# Patient Record
Sex: FEMALE | Race: WHITE | NOT HISPANIC OR LATINO | Employment: FULL TIME | URBAN - METROPOLITAN AREA
[De-identification: names, ages, dates, MRNs, and addresses within clinical notes are randomized per-mention and may not be internally consistent; named-entity substitution may affect disease eponyms.]

---

## 2017-02-09 PROBLEM — E28.2 PCOS (POLYCYSTIC OVARIAN SYNDROME): Status: ACTIVE | Noted: 2017-02-09

## 2017-02-09 PROBLEM — E66.01 MORBID OBESITY DUE TO EXCESS CALORIES: Status: ACTIVE | Noted: 2017-02-09

## 2017-10-24 ENCOUNTER — OFFICE VISIT (OUTPATIENT)
Dept: URGENT CARE | Facility: CLINIC | Age: 22
End: 2017-10-24
Payer: COMMERCIAL

## 2017-10-24 VITALS
HEIGHT: 65 IN | DIASTOLIC BLOOD PRESSURE: 97 MMHG | OXYGEN SATURATION: 99 % | TEMPERATURE: 98 F | BODY MASS INDEX: 36.65 KG/M2 | WEIGHT: 220 LBS | SYSTOLIC BLOOD PRESSURE: 145 MMHG | HEART RATE: 90 BPM

## 2017-10-24 DIAGNOSIS — J01.40 ACUTE PANSINUSITIS, RECURRENCE NOT SPECIFIED: Primary | ICD-10-CM

## 2017-10-24 PROCEDURE — 96372 THER/PROPH/DIAG INJ SC/IM: CPT | Mod: S$GLB,,, | Performed by: FAMILY MEDICINE

## 2017-10-24 PROCEDURE — 99203 OFFICE O/P NEW LOW 30 MIN: CPT | Mod: 25,S$GLB,, | Performed by: FAMILY MEDICINE

## 2017-10-24 RX ORDER — DEXAMETHASONE SODIUM PHOSPHATE 100 MG/10ML
7 INJECTION INTRAMUSCULAR; INTRAVENOUS ONCE
Status: COMPLETED | OUTPATIENT
Start: 2017-10-24 | End: 2017-10-24

## 2017-10-24 RX ORDER — BENZONATATE 200 MG/1
200 CAPSULE ORAL 3 TIMES DAILY PRN
Qty: 30 CAPSULE | Refills: 0 | Status: SHIPPED | OUTPATIENT
Start: 2017-10-24 | End: 2017-11-03

## 2017-10-24 RX ORDER — AZITHROMYCIN 250 MG/1
250 TABLET, FILM COATED ORAL DAILY
Qty: 6 TABLET | Refills: 0 | Status: SHIPPED | OUTPATIENT
Start: 2017-10-24 | End: 2017-12-15

## 2017-10-24 RX ORDER — CEFTRIAXONE 500 MG/1
500 INJECTION, POWDER, FOR SOLUTION INTRAMUSCULAR; INTRAVENOUS
Status: COMPLETED | OUTPATIENT
Start: 2017-10-24 | End: 2017-10-24

## 2017-10-24 RX ORDER — CODEINE PHOSPHATE AND GUAIFENESIN 10; 100 MG/5ML; MG/5ML
10 SOLUTION ORAL NIGHTLY PRN
Qty: 120 ML | Refills: 0 | Status: SHIPPED | OUTPATIENT
Start: 2017-10-24 | End: 2017-11-07

## 2017-10-24 RX ADMIN — CEFTRIAXONE 500 MG: 500 INJECTION, POWDER, FOR SOLUTION INTRAMUSCULAR; INTRAVENOUS at 11:10

## 2017-10-24 RX ADMIN — DEXAMETHASONE SODIUM PHOSPHATE 7 MG: 100 INJECTION INTRAMUSCULAR; INTRAVENOUS at 11:10

## 2017-10-24 NOTE — PATIENT INSTRUCTIONS
Consider adding over-the-counter PROBIOTICS to decrease some side-effects associated with antibiotics. When a person takes antibiotics, both the harmful bacteria and the beneficial bacteria are killed. A reduction of beneficial bacteria can lead to digestive problems, such as diarrhea, yeast infections and urinary tract infections.      Sinusitis (Antibiotic Treatment)    The sinuses are air-filled spaces within the bones of the face. They connect to the inside of the nose. Sinusitis is an inflammation of the tissue lining the sinus cavity. Sinus inflammation can occur during a cold. It can also be due to allergies to pollens and other particles in the air. Sinusitis can cause symptoms of sinus congestion and fullness. A sinus infection causes fever, headache and facial pain. There is often green or yellow drainage from the nose or into the back of the throat (post-nasal drip). You have been given antibiotics to treat this condition.  Home care:  · Take the full course of antibiotics as instructed. Do not stop taking them, even if you feel better.  · Drink plenty of water, hot tea, and other liquids. This may help thin mucus. It also may promote sinus drainage.  · Heat may help soothe painful areas of the face. Use a towel soaked in hot water. Or,  the shower and direct the hot spray onto your face. Using a vaporizer along with a menthol rub at night may also help.   · An expectorant containing guaifenesin may help thin the mucus and promote drainage from the sinuses.  · Over-the-counter decongestants may be used unless a similar medicine was prescribed. Nasal sprays work the fastest. Use one that contains phenylephrine or oxymetazoline. First blow the nose gently. Then use the spray. Do not use these medicines more often than directed on the label or symptoms may get worse. You may also use tablets containing pseudoephedrine. Avoid products that combine ingredients, because side effects may be  increased. Read labels. You can also ask the pharmacist for help. (NOTE: Persons with high blood pressure should not use decongestants. They can raise blood pressure.)  · Over-the-counter antihistamines may help if allergies contributed to your sinusitis.    · Do not use nasal rinses or irrigation during an acute sinus infection, unless told to by your health care provider. Rinsing may spread the infection to other sinuses.  · Use acetaminophen or ibuprofen to control pain, unless another pain medicine was prescribed. (If you have chronic liver or kidney disease or ever had a stomach ulcer, talk with your doctor before using these medicines. Aspirin should never be used in anyone under 18 years of age who is ill with a fever. It may cause severe liver damage.)  · Don't smoke. This can worsen symptoms.  Follow-up care  Follow up with your healthcare provider or our staff if you are not improving within the next week.  When to seek medical advice  Call your healthcare provider if any of these occur:  · Facial pain or headache becoming more severe  · Stiff neck  · Unusual drowsiness or confusion  · Swelling of the forehead or eyelids  · Vision problems, including blurred or double vision  · Fever of 100.4ºF (38ºC) or higher, or as directed by your healthcare provider  · Seizure  · Breathing problems  · Symptoms not resolving within 10 days  Date Last Reviewed: 4/13/2015  © 5491-8590 [employer]. [address] [place] [address], [place] [address]. All rights reserved. This information is not intended as a substitute for professional medical care. Always follow your healthcare professional's instructions.

## 2017-12-15 ENCOUNTER — OFFICE VISIT (OUTPATIENT)
Dept: URGENT CARE | Facility: CLINIC | Age: 22
End: 2017-12-15
Payer: COMMERCIAL

## 2017-12-15 VITALS
WEIGHT: 225 LBS | TEMPERATURE: 99 F | DIASTOLIC BLOOD PRESSURE: 73 MMHG | RESPIRATION RATE: 18 BRPM | OXYGEN SATURATION: 97 % | BODY MASS INDEX: 42.48 KG/M2 | SYSTOLIC BLOOD PRESSURE: 123 MMHG | HEIGHT: 61 IN | HEART RATE: 118 BPM

## 2017-12-15 DIAGNOSIS — R11.0 NAUSEA: ICD-10-CM

## 2017-12-15 DIAGNOSIS — R19.7 DIARRHEA, UNSPECIFIED TYPE: Primary | ICD-10-CM

## 2017-12-15 DIAGNOSIS — R00.0 TACHYCARDIA: ICD-10-CM

## 2017-12-15 DIAGNOSIS — R11.10 VOMITING, INTRACTABILITY OF VOMITING NOT SPECIFIED, PRESENCE OF NAUSEA NOT SPECIFIED, UNSPECIFIED VOMITING TYPE: ICD-10-CM

## 2017-12-15 PROCEDURE — S0119 ONDANSETRON 4 MG: HCPCS | Mod: S$GLB,,, | Performed by: EMERGENCY MEDICINE

## 2017-12-15 PROCEDURE — 99214 OFFICE O/P EST MOD 30 MIN: CPT | Mod: S$GLB,,, | Performed by: PHYSICIAN ASSISTANT

## 2017-12-15 RX ORDER — ONDANSETRON 4 MG/1
4 TABLET, ORALLY DISINTEGRATING ORAL
Status: COMPLETED | OUTPATIENT
Start: 2017-12-15 | End: 2017-12-15

## 2017-12-15 RX ORDER — LOPERAMIDE HYDROCHLORIDE 2 MG/1
2 CAPSULE ORAL 4 TIMES DAILY PRN
Qty: 26 CAPSULE | Refills: 0 | Status: SHIPPED | OUTPATIENT
Start: 2017-12-15 | End: 2017-12-19

## 2017-12-15 RX ORDER — ONDANSETRON 4 MG/1
4 TABLET, ORALLY DISINTEGRATING ORAL EVERY 6 HOURS PRN
Qty: 16 TABLET | Refills: 0 | Status: SHIPPED | OUTPATIENT
Start: 2017-12-15 | End: 2017-12-19

## 2017-12-15 RX ADMIN — ONDANSETRON 4 MG: 4 TABLET, ORALLY DISINTEGRATING ORAL at 09:12

## 2017-12-15 NOTE — PROGRESS NOTES
"Subjective:       Patient ID: Milana Navarro is a 22 y.o. female.    Vitals:  height is 5' 1" (1.549 m) and weight is 102.1 kg (225 lb). Her oral temperature is 99.4 °F (37.4 °C). Her blood pressure is 123/73 and her pulse is 118 (abnormal). Her respiration is 18 and oxygen saturation is 97%.     Chief Complaint: Diarrhea and Emesis    Diarrhea    This is a new problem. The current episode started yesterday. The problem occurs more than 10 times per day. The stool consistency is described as watery. The patient states that diarrhea awakens her from sleep. Associated symptoms include vomiting. Pertinent negatives include no abdominal pain, chills or fever. Nothing aggravates the symptoms. Risk factors include suspect food intake. She has tried bismuth subsalicylate for the symptoms. The treatment provided no relief.   Emesis    Associated symptoms include diarrhea. Pertinent negatives include no abdominal pain, chest pain, chills or fever.     Review of Systems   Constitution: Negative for chills and fever.   Cardiovascular: Negative for chest pain.   Respiratory: Negative for shortness of breath.    Musculoskeletal: Negative for back pain.   Gastrointestinal: Positive for diarrhea, nausea and vomiting. Negative for abdominal pain, constipation, hematochezia and melena.   Genitourinary: Negative for dysuria.       Objective:      Physical Exam   Constitutional: She is oriented to person, place, and time. She appears well-developed and well-nourished. She is cooperative.  Non-toxic appearance. She does not appear ill. No distress.   HENT:   Head: Normocephalic and atraumatic.   Right Ear: Hearing and external ear normal.   Left Ear: Hearing and external ear normal.   Nose: Nose normal.   Mouth/Throat: Uvula is midline, oropharynx is clear and moist and mucous membranes are normal. No posterior oropharyngeal erythema.   Eyes: Conjunctivae and lids are normal. Right eye exhibits no discharge. Left eye exhibits no " discharge. No scleral icterus.   Sclera clear bilat   Neck: Trachea normal, normal range of motion, full passive range of motion without pain and phonation normal. Neck supple.   Cardiovascular: Regular rhythm, normal heart sounds, intact distal pulses and normal pulses.  Tachycardia present.    Pulmonary/Chest: Effort normal and breath sounds normal. No respiratory distress. She has no decreased breath sounds. She has no wheezes. She has no rhonchi. She has no rales.   Abdominal: Soft. Normal appearance. She exhibits no distension, no abdominal bruit, no pulsatile midline mass and no mass. Bowel sounds are increased. There is generalized tenderness. There is no rigidity, no rebound, no guarding, no CVA tenderness, no tenderness at McBurney's point and negative Vela's sign.   Generalized abdominal tenderness.   Musculoskeletal: Normal range of motion. She exhibits no edema or deformity.   Neurological: She is alert and oriented to person, place, and time. She has normal strength. She exhibits normal muscle tone. Coordination normal.   Skin: Skin is warm, dry and intact. She is not diaphoretic. No pallor.   Turgor appropriate   Psychiatric: She has a normal mood and affect. Her speech is normal and behavior is normal. Judgment and thought content normal. Cognition and memory are normal.   Nursing note and vitals reviewed.      Assessment:       1. Diarrhea, unspecified type    2. Vomiting, intractability of vomiting not specified, presence of nausea not specified, unspecified vomiting type    3. Nausea    4. Tachycardia        - Tachycardia likely due to mild dehydration, no other signs/symptoms concerning for severe dehydration.  - Generalized abd tenderness likely of rectus muscles due to retching  Plan:         Diarrhea, unspecified type  -     loperamide (IMODIUM) 2 mg capsule; Take 1 capsule (2 mg total) by mouth 4 (four) times daily as needed for Diarrhea.  Dispense: 26 capsule; Refill: 0    Vomiting,  intractability of vomiting not specified, presence of nausea not specified, unspecified vomiting type    Nausea  -     ondansetron disintegrating tablet 4 mg; Take 1 tablet (4 mg total) by mouth one time.  -     ondansetron (ZOFRAN-ODT) 4 MG TbDL; Take 1 tablet (4 mg total) by mouth every 6 (six) hours as needed.  Dispense: 16 tablet; Refill: 0    Tachycardia        Patient Instructions   Please return here or go to the Emergency Department for any concerns or worsening of condition.  If you were prescribed antibiotics, please take them to completion.  If you were prescribed a narcotic medication, do not drive or operate heavy equipment or machinery while taking these medications.  Please follow up with your primary care doctor or specialist as needed.    If you  smoke, please stop smoking.      Uncertain Causes of Diarrhea (Adult)    Diarrhea is when stools are loose and watery. This can be caused by:  · Viral infections  · Bacterial infections  · Food poisoning  · Parasites  · Irritable bowel syndrome (IBS)  · Inflammatory bowel diseases such as ulcerative colitis, Crohn's disease, and celiac disease  · Food intolerance, such as to lactose, the sugar found in milk and milk products  · Reaction to medicines like antibiotics, laxatives, cancer drugs, and antacids  Along with diarrhea, you may also have:  · Abdominal pain and cramping  · Nausea and vomiting  · Loss of bowel control  · Fever and chills  · Bloody stools  In some cases, antibiotics may help to treat diarrhea. You may have a stool sample test. This is done to see what is causing your diarrhea, and if antibiotics will help treat it. The results of a stool sample test may take up to 2 days. The healthcare provider may not give you antibiotics until he or she has the stool test results.  Diarrhea can cause dehydration. This is the loss of too much water and other fluids from the body. When this occurs, body fluid must be replaced. This can be done with oral  rehydration solutions. Oral rehydration solutions are available at drugstores and grocery stores without a prescription.  Home care  Follow all instructions given by your healthcare provider. Rest at home for the next 24 hours, or until you feel better. Avoid caffeine, tobacco, and alcohol. These can make diarrhea, cramping, and pain worse.  If taking medicines:  · Dont take over-the-counter diarrhea or nausea medicines unless your healthcare provider tells you to.  · You may use acetaminophen or NSAID medicines like ibuprofen or naproxen to reduce pain and fever. Dont use these if you have chronic liver or kidney disease, or ever had a stomach ulcer or gastrointestinal bleeding. Don't use NSAID medicines if you are already taking one for another condition (like arthritis) or are on daily aspirin therapy (such as for heart disease or after a stroke). Talk with your healthcare provider first.  · If antibiotics were prescribed, be sure you take them until they are finished. Dont stop taking them even when you feel better. Antibiotics must be taken as a full course.  To prevent the spread of illness:  · Remember that washing with soap and water and using alcohol-based  is the best way to prevent the spread of infection.  · Clean the toilet after each use.  · Wash your hands before eating.  · Wash your hands before and after preparing food. Keep in mind that people with diarrhea or vomiting should not prepare food for others.  · Wash your hands after using cutting boards, countertops, and knives that have been in contact with raw foods.  · Wash and then peel fruits and vegetables.  · Keep uncooked meats away from cooked and ready-to-eat foods.  · Use a food thermometer when cooking. Cook poultry to at least 165°F (74°C). Cook ground meat (beef, veal, pork, lamb) to at least 160°F (71°C). Cook fresh beef, veal, lamb, and pork to at least 145°F (63°C).  · Dont eat raw or undercooked eggs (poached or odalys  side up), poultry, meat, or unpasteurized milk and juices.  Food and drinks  The main goal while treating vomiting or diarrhea is to prevent dehydration. This is done by taking small amounts of liquids often.  · Keep in mind that liquids are more important than food right now.  · Drink only small amounts of liquids at a time.  · Dont force yourself to eat, especially if you are having cramping, vomiting, or diarrhea. Dont eat large amounts at a time, even if you are hungry.  · If you eat, avoid fatty, greasy, spicy, or fried foods.  · Dont eat dairy foods or drink milk if you have diarrhea. These can make diarrhea worse.  During the first 24 hours you can try:  · Oral rehydration solutions. Do not use sports drinks. They have too much sugar and not enough electrolytes.  · Soft drinks without caffeine  · Ginger ale  · Water (plain or flavored)  · Decaf tea or coffee  · Clear broth, consommé, or bouillon  · Gelatin, popsicles, or frozen fruit juice bars  The second 24 hours, if you are feeling better, you can add:  · Hot cereal, plain toast, bread, rolls, or crackers  · Plain noodles, rice, mashed potatoes, chicken noodle soup, or rice soup  · Unsweetened canned fruit (no pineapple)  · Bananas  As you recover:  · Limit fat intake to less than 15 grams per day. Dont eat margarine, butter, oils, mayonnaise, sauces, gravies, fried foods, peanut butter, meat, poultry, or fish.  · Limit fiber. Dont eat raw or cooked vegetables, fresh fruits except bananas, or bran cereals.  · Limit caffeine and chocolate.  · Limit dairy.  · Dont use spices or seasonings except salt.  · Go back to your normal diet over time, as you feel better and your symptoms improve.  · If the symptoms come back, go back to a simple diet or clear liquids.  Follow-up care  Follow up with your healthcare provider, or as advised. If a stool sample was taken or cultures were done, call the healthcare provider for the results as instructed.  Call  911  Call 911 if you have any of these symptoms:  · Trouble breathing  · Confusion  · Extreme drowsiness or trouble walking  · Loss of consciousness  · Rapid heart rate  · Chest pain  · Stiff neck  · Seizure  When to seek medical advice  Call your healthcare provider right away if any of these occur:  · Abdominal pain that gets worse  · Constant lower right abdominal pain  · Continued vomiting and inability to keep liquids down  · Diarrhea more than 5 times a day  · Blood in vomit or stool  · Dark urine or no urine for 8 hours, dry mouth and tongue, tiredness, weakness, or dizziness  · Drowsiness  · New rash  · You dont get better in 2 to 3 days  · Fever of 100.4°F (38°C) or higher that doesnt get lower with medicine  Date Last Reviewed: 1/3/2016  © 4319-9841 [employer]. [address] [place] [address], [place] [address]. All rights reserved. This information is not intended as a substitute for professional medical care. Always follow your healthcare professional's instructions.

## 2017-12-15 NOTE — PATIENT INSTRUCTIONS
Please return here or go to the Emergency Department for any concerns or worsening of condition.  If you were prescribed antibiotics, please take them to completion.  If you were prescribed a narcotic medication, do not drive or operate heavy equipment or machinery while taking these medications.  Please follow up with your primary care doctor or specialist as needed.    If you  smoke, please stop smoking.      Uncertain Causes of Diarrhea (Adult)    Diarrhea is when stools are loose and watery. This can be caused by:  · Viral infections  · Bacterial infections  · Food poisoning  · Parasites  · Irritable bowel syndrome (IBS)  · Inflammatory bowel diseases such as ulcerative colitis, Crohn's disease, and celiac disease  · Food intolerance, such as to lactose, the sugar found in milk and milk products  · Reaction to medicines like antibiotics, laxatives, cancer drugs, and antacids  Along with diarrhea, you may also have:  · Abdominal pain and cramping  · Nausea and vomiting  · Loss of bowel control  · Fever and chills  · Bloody stools  In some cases, antibiotics may help to treat diarrhea. You may have a stool sample test. This is done to see what is causing your diarrhea, and if antibiotics will help treat it. The results of a stool sample test may take up to 2 days. The healthcare provider may not give you antibiotics until he or she has the stool test results.  Diarrhea can cause dehydration. This is the loss of too much water and other fluids from the body. When this occurs, body fluid must be replaced. This can be done with oral rehydration solutions. Oral rehydration solutions are available at drugstores and grocery stores without a prescription.  Home care  Follow all instructions given by your healthcare provider. Rest at home for the next 24 hours, or until you feel better. Avoid caffeine, tobacco, and alcohol. These can make diarrhea, cramping, and pain worse.  If taking medicines:  · Dont take  over-the-counter diarrhea or nausea medicines unless your healthcare provider tells you to.  · You may use acetaminophen or NSAID medicines like ibuprofen or naproxen to reduce pain and fever. Dont use these if you have chronic liver or kidney disease, or ever had a stomach ulcer or gastrointestinal bleeding. Don't use NSAID medicines if you are already taking one for another condition (like arthritis) or are on daily aspirin therapy (such as for heart disease or after a stroke). Talk with your healthcare provider first.  · If antibiotics were prescribed, be sure you take them until they are finished. Dont stop taking them even when you feel better. Antibiotics must be taken as a full course.  To prevent the spread of illness:  · Remember that washing with soap and water and using alcohol-based  is the best way to prevent the spread of infection.  · Clean the toilet after each use.  · Wash your hands before eating.  · Wash your hands before and after preparing food. Keep in mind that people with diarrhea or vomiting should not prepare food for others.  · Wash your hands after using cutting boards, countertops, and knives that have been in contact with raw foods.  · Wash and then peel fruits and vegetables.  · Keep uncooked meats away from cooked and ready-to-eat foods.  · Use a food thermometer when cooking. Cook poultry to at least 165°F (74°C). Cook ground meat (beef, veal, pork, lamb) to at least 160°F (71°C). Cook fresh beef, veal, lamb, and pork to at least 145°F (63°C).  · Dont eat raw or undercooked eggs (poached or odalys side up), poultry, meat, or unpasteurized milk and juices.  Food and drinks  The main goal while treating vomiting or diarrhea is to prevent dehydration. This is done by taking small amounts of liquids often.  · Keep in mind that liquids are more important than food right now.  · Drink only small amounts of liquids at a time.  · Dont force yourself to eat, especially if you  are having cramping, vomiting, or diarrhea. Dont eat large amounts at a time, even if you are hungry.  · If you eat, avoid fatty, greasy, spicy, or fried foods.  · Dont eat dairy foods or drink milk if you have diarrhea. These can make diarrhea worse.  During the first 24 hours you can try:  · Oral rehydration solutions. Do not use sports drinks. They have too much sugar and not enough electrolytes.  · Soft drinks without caffeine  · Ginger ale  · Water (plain or flavored)  · Decaf tea or coffee  · Clear broth, consommé, or bouillon  · Gelatin, popsicles, or frozen fruit juice bars  The second 24 hours, if you are feeling better, you can add:  · Hot cereal, plain toast, bread, rolls, or crackers  · Plain noodles, rice, mashed potatoes, chicken noodle soup, or rice soup  · Unsweetened canned fruit (no pineapple)  · Bananas  As you recover:  · Limit fat intake to less than 15 grams per day. Dont eat margarine, butter, oils, mayonnaise, sauces, gravies, fried foods, peanut butter, meat, poultry, or fish.  · Limit fiber. Dont eat raw or cooked vegetables, fresh fruits except bananas, or bran cereals.  · Limit caffeine and chocolate.  · Limit dairy.  · Dont use spices or seasonings except salt.  · Go back to your normal diet over time, as you feel better and your symptoms improve.  · If the symptoms come back, go back to a simple diet or clear liquids.  Follow-up care  Follow up with your healthcare provider, or as advised. If a stool sample was taken or cultures were done, call the healthcare provider for the results as instructed.  Call 911  Call 911 if you have any of these symptoms:  · Trouble breathing  · Confusion  · Extreme drowsiness or trouble walking  · Loss of consciousness  · Rapid heart rate  · Chest pain  · Stiff neck  · Seizure  When to seek medical advice  Call your healthcare provider right away if any of these occur:  · Abdominal pain that gets worse  · Constant lower right abdominal  pain  · Continued vomiting and inability to keep liquids down  · Diarrhea more than 5 times a day  · Blood in vomit or stool  · Dark urine or no urine for 8 hours, dry mouth and tongue, tiredness, weakness, or dizziness  · Drowsiness  · New rash  · You dont get better in 2 to 3 days  · Fever of 100.4°F (38°C) or higher that doesnt get lower with medicine  Date Last Reviewed: 1/3/2016  © 7139-7981 [employer]. [address] [place] [address], [place] [address]. All rights reserved. This information is not intended as a substitute for professional medical care. Always follow your healthcare professional's instructions.

## 2018-05-29 ENCOUNTER — HOSPITAL ENCOUNTER (EMERGENCY)
Facility: HOSPITAL | Age: 23
Discharge: HOME | End: 2018-05-29
Attending: EMERGENCY MEDICINE
Payer: COMMERCIAL

## 2018-05-29 VITALS
HEIGHT: 61 IN | DIASTOLIC BLOOD PRESSURE: 82 MMHG | TEMPERATURE: 98 F | OXYGEN SATURATION: 98 % | HEART RATE: 76 BPM | RESPIRATION RATE: 18 BRPM | SYSTOLIC BLOOD PRESSURE: 147 MMHG | WEIGHT: 210 LBS | BODY MASS INDEX: 39.65 KG/M2

## 2018-05-29 DIAGNOSIS — M25.473 ANKLE SWELLING: ICD-10-CM
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
## 2018-05-29 DIAGNOSIS — S93.402A SPRAIN OF LEFT ANKLE, UNSPECIFIED LIGAMENT, INITIAL ENCOUNTER: Primary | ICD-10-CM

## 2018-05-29 LAB
B-HCG UR QL: NEGATIVE
CTP QC/QA: YES

## 2018-05-29 PROCEDURE — 81025 URINE PREGNANCY TEST: CPT | Performed by: EMERGENCY MEDICINE

## 2018-05-29 PROCEDURE — 99284 EMERGENCY DEPT VISIT MOD MDM: CPT | Mod: 25

## 2018-05-29 RX ORDER — DICLOFENAC SODIUM 10 MG/G
2 GEL TOPICAL 4 TIMES DAILY
Qty: 1 TUBE | Refills: 0 | OUTPATIENT
Start: 2018-05-29 | End: 2021-10-15

## 2018-05-29 RX ORDER — NAPROXEN 375 MG/1
375 TABLET ORAL 2 TIMES DAILY PRN
Qty: 20 TABLET | Refills: 0 | Status: SHIPPED | OUTPATIENT
Start: 2018-05-29 | End: 2018-06-03

## 2018-05-29 NOTE — ED PROVIDER NOTES
Encounter Date: 5/29/2018       History     Chief Complaint   Patient presents with    Ankle Pain     pt reports slipping in gravel on Sunday and c/o left ankle pain. pt reports taking OTC medicine for pain but nothing has helped     22-year-old female chief complaint left ankle swelling and bruising.  Patient states she tripped rolling her ankle on Sunday.  Injury resulted from slipping on gravel.  Patient has been taking over-the-counter pain medicines which did relieve the pain.  Patient states she currently has 0 pain. Patient denies numbness tingling weakness. Patient does not smoke cigarettes, does not use drugs, and does not drink alcohol.      The history is provided by the patient.     Review of patient's allergies indicates:   Allergen Reactions    Bactrim [sulfamethoxazole-trimethoprim]      Past Medical History:   Diagnosis Date    PCOS (polycystic ovarian syndrome)      Past Surgical History:   Procedure Laterality Date    ELBOW SURGERY       Family History   Problem Relation Age of Onset    Colon cancer Maternal Grandfather     Breast cancer Neg Hx     Ovarian cancer Neg Hx      Social History   Substance Use Topics    Smoking status: Never Smoker    Smokeless tobacco: Never Used    Alcohol use No     Review of Systems   Constitutional: Negative for fever.   HENT: Negative for sore throat.    Respiratory: Negative for shortness of breath.    Cardiovascular: Negative for chest pain.   Gastrointestinal: Negative for nausea.   Genitourinary: Negative for dysuria.   Musculoskeletal: Positive for joint swelling. Negative for back pain.   Skin: Negative for rash.   Neurological: Negative for weakness.   Hematological: Bruises/bleeds easily.   All other systems reviewed and are negative.      Physical Exam     Initial Vitals [05/29/18 0743]   BP Pulse Resp Temp SpO2   (!) 152/97 102 18 97.8 °F (36.6 °C) 99 %      MAP       115.33         Physical Exam    Nursing note and vitals  reviewed.  Constitutional: She appears well-developed and well-nourished.   HENT:   Head: Normocephalic and atraumatic.   Right Ear: External ear normal.   Left Ear: External ear normal.   Nose: Nose normal.   Eyes: Conjunctivae and EOM are normal. Pupils are equal, round, and reactive to light. Right eye exhibits no discharge. Left eye exhibits no discharge.   Neck: Normal range of motion. Neck supple.   Cardiovascular: Normal rate, regular rhythm, normal heart sounds and intact distal pulses. Exam reveals no gallop and no friction rub.    No murmur heard.  Pulmonary/Chest: Breath sounds normal. No respiratory distress. She has no wheezes. She has no rhonchi. She has no rales. She exhibits no tenderness.   Musculoskeletal: She exhibits edema.        Right shoulder: She exhibits swelling.        Left knee: Normal. She exhibits normal range of motion, no swelling and no effusion. No tenderness found.        Left ankle: She exhibits decreased range of motion and swelling. Tenderness. Lateral malleolus tenderness found. No medial malleolus tenderness found.        Left foot: Normal. There is normal range of motion, no tenderness, no bony tenderness and no swelling.        Feet:    Neurological: She is alert and oriented to person, place, and time. She has normal strength. No sensory deficit.   Skin: Skin is warm and dry. Capillary refill takes less than 2 seconds. No rash noted. No pallor.   Psychiatric: She has a normal mood and affect.         ED Course   Procedures  Labs Reviewed   POCT URINE PREGNANCY        Imaging Results          X-Ray Tibia Fibula 2 View Left (Final result)  Result time 05/29/18 08:15:40    Final result by Sarabjit Clayton MD (05/29/18 08:15:40)                 Impression:      Soft tissue swelling overlying the lateral malleolus.  No evidence for acute fracture or bone destruction.      Electronically signed by: Sarabjit Clayton MD  Date:    05/29/2018  Time:    08:15             Narrative:     EXAMINATION:  XR TIBIA FIBULA 2 VIEW LEFT    CLINICAL HISTORY:  Unspecified fall, initial encounter    TECHNIQUE:  AP and lateral views of the left tibia and fibula were performed.    COMPARISON:  None.    FINDINGS:  The left tibia and fibula appear intact.  There is no evidence for acute fracture or bone destruction.  There is soft tissue swelling overlying the lateral malleolus.                               X-Ray Ankle Complete Left (Final result)  Result time 05/29/18 08:14:54    Final result by Sarabjit Clayton MD (05/29/18 08:14:54)                 Impression:      Soft tissue swelling overlying the lateral malleolus.  No evidence for acute fracture, bone destruction, or dislocation.      Electronically signed by: Sarabjit Clayton MD  Date:    05/29/2018  Time:    08:14             Narrative:    EXAMINATION:  XR ANKLE COMPLETE 3 VIEW LEFT    CLINICAL HISTORY:  Effusion, unspecified ankle    TECHNIQUE:  AP, lateral and oblique views of the left ankle were performed.    COMPARISON:  None    FINDINGS:  The bones appear intact.  There is no evidence for acute fracture or bone destruction.  Joint spaces are well maintained.  There is no evidence for dislocation.  There is soft tissue swelling overlying the lateral malleolus.                                                   Medical decision making   Chief complaint:  Left ankle pain and swelling   Differential diagnosis:  Strain, sprain, contusion, and fracture  Treatment in the ED Physical Exam, patient declined Toradol or ibuprofen.  Discussed labs, and imaging results.  Ace wrap applied.  Discussed use of compression hose.  Fill and take prescriptions as directed.  Return to the ED if symptoms worsen or do not resolve.   Answered questions and discussed discharge plan.    Patient feels much better and is ready for discharge.  Follow up with PCP in 1 days.       Clinical Impression:   The primary encounter diagnosis was Sprain of left ankle, unspecified ligament,  initial encounter. Diagnoses of Ankle swelling and Fall were also pertinent to this visit.                           Lissette Graham DO  05/29/18 0808

## 2018-11-30 ENCOUNTER — OFFICE VISIT (OUTPATIENT)
Dept: URGENT CARE | Facility: CLINIC | Age: 23
End: 2018-11-30
Payer: COMMERCIAL

## 2018-11-30 VITALS
TEMPERATURE: 99 F | HEART RATE: 95 BPM | DIASTOLIC BLOOD PRESSURE: 89 MMHG | HEIGHT: 61 IN | OXYGEN SATURATION: 99 % | SYSTOLIC BLOOD PRESSURE: 141 MMHG | WEIGHT: 215 LBS | BODY MASS INDEX: 40.59 KG/M2 | RESPIRATION RATE: 16 BRPM

## 2018-11-30 DIAGNOSIS — H92.03 PAIN IN EAR, BILATERAL: Primary | ICD-10-CM

## 2018-11-30 PROCEDURE — 96372 THER/PROPH/DIAG INJ SC/IM: CPT | Mod: S$GLB,,, | Performed by: NURSE PRACTITIONER

## 2018-11-30 PROCEDURE — 99214 OFFICE O/P EST MOD 30 MIN: CPT | Mod: 25,S$GLB,, | Performed by: NURSE PRACTITIONER

## 2018-11-30 PROCEDURE — 3008F BODY MASS INDEX DOCD: CPT | Mod: CPTII,S$GLB,, | Performed by: NURSE PRACTITIONER

## 2018-11-30 RX ORDER — BETAMETHASONE SODIUM PHOSPHATE AND BETAMETHASONE ACETATE 3; 3 MG/ML; MG/ML
9 INJECTION, SUSPENSION INTRA-ARTICULAR; INTRALESIONAL; INTRAMUSCULAR; SOFT TISSUE ONCE
Status: COMPLETED | OUTPATIENT
Start: 2018-11-30 | End: 2018-11-30

## 2018-11-30 RX ORDER — FLUTICASONE PROPIONATE 50 MCG
1 SPRAY, SUSPENSION (ML) NASAL DAILY
Qty: 1 BOTTLE | Refills: 0 | Status: SHIPPED | OUTPATIENT
Start: 2018-11-30 | End: 2020-02-20

## 2018-11-30 RX ADMIN — BETAMETHASONE SODIUM PHOSPHATE AND BETAMETHASONE ACETATE 9 MG: 3; 3 INJECTION, SUSPENSION INTRA-ARTICULAR; INTRALESIONAL; INTRAMUSCULAR; SOFT TISSUE at 11:11

## 2018-11-30 NOTE — PROGRESS NOTES
"Subjective:       Patient ID: Milana Navarro is a 23 y.o. female.    Vitals:  height is 5' 1" (1.549 m) and weight is 97.5 kg (215 lb). Her temperature is 98.5 °F (36.9 °C). Her blood pressure is 141/89 (abnormal) and her pulse is 95. Her respiration is 16 and oxygen saturation is 99%.     Chief Complaint: Otalgia    Cc x 3d,    C/o intermittent, mild dizziness       Otalgia    There is pain in both ears. This is a new problem. The current episode started in the past 7 days. The problem occurs constantly. Pertinent negatives include no coughing, rash, sore throat or vomiting.       Constitution: Negative for chills, sweating, fatigue and fever.   HENT: Positive for ear pain. Negative for congestion, sinus pain, sinus pressure, sore throat and voice change.    Neck: Negative for painful lymph nodes.   Eyes: Negative for eye redness.   Respiratory: Negative for chest tightness, cough, sputum production, bloody sputum, COPD, shortness of breath, stridor, wheezing and asthma.    Gastrointestinal: Negative for nausea and vomiting.   Musculoskeletal: Negative for muscle ache.   Skin: Negative for rash.   Allergic/Immunologic: Negative for seasonal allergies and asthma.   Hematologic/Lymphatic: Negative for swollen lymph nodes.       Objective:      Physical Exam    Assessment:       No diagnosis found.    Plan:         There are no diagnoses linked to this encounter.     "

## 2018-11-30 NOTE — PATIENT INSTRUCTIONS
Flonase nasal spray  Sudafed, Zyrtec-D, or Claritin-D        Earache, No Infection (Adult)  Earaches can happen without an infection. This occurs when air and fluid build up behind the eardrum causing a feeling of fullness and discomfort and reduced hearing. This is called otitis media with effusion (OME) or serous otitis media. It means there is fluid in the middle ear. It is not the same as acute otitis media, which is typically from infection.  OME can happen when you have a cold if congestion blocks the passage that drains the middle ear. This passage is called the eustachian tube. OME may also occur with nasal allergies or after a bacterial middle ear infection.    The pain or discomfort may come and go. You may hear clicking or popping sounds when you chew or swallow. You may feel that your balance is off. Or you may hear ringing in the ear.  It often takes from several weeks up to 3 months for the fluid to clear on its own. Oral pain relievers and ear drops help if there is pain. Decongestants and antihistamines sometimes help. Antibiotics don't help since there is no infection. Your doctor may prescribe a nasal spray to help reduce swelling in the nose and eustachian tube. This can allow the ear to drain.  If your OME doesn't improve after 3 months, surgery may be used to drain the fluid and insert a small tube in the eardrum to allow continued drainage.  Because the middle ear fluid can become infected, it is important to watch for signs of an ear infection which may develop later. These signs include increased ear pain, fever, or drainage from the ear.  Home care  The following guidelines will help you care for yourself at home:  · You may use over-the-counter medicine as directed to control pain, unless another medicine was prescribed. If you have chronic liver or kidney disease or ever had a stomach ulcer or GI bleeding, talk with your doctor before using these medicines. Aspirin should never be used in  anyone under 18 years of age who is ill with a fever. It may cause severe liver damage.  · You may use over-the-counter decongestants such as phenylephrine or pseudoephedrine. But they are not always helpful. Don't use nasal spray decongestants more than 3 days. Longer use can make congestion worse. Prescription nasal sprays from your doctor don't typically have those restrictions.  · Antihistamines may help if you are also having allergy symptoms.  · You may use medicines such as guaifenesin to thin mucus and promote drainage.  Follow-up care  Follow up with your healthcare provider or as advised if you are not feeling better after 3 days.  When to seek medical advice  Call your healthcare provider right away if any of the following occur:  · Your ear pain gets worse or does not start to improve   · Fever of 100.4°F (38°C) or higher, or as directed by your healthcare provider  · Fluid or blood draining from the ear  · Headache or sinus pain  · Stiff neck  · Unusual drowsiness or confusion  Date Last Reviewed: 10/1/2016  © 5298-6744 [employer]. [address] [place] [address], [place] [address]. All rights reserved. This information is not intended as a substitute for professional medical care. Always follow your healthcare professional's instructions.

## 2019-05-22 ENCOUNTER — OFFICE VISIT (OUTPATIENT)
Dept: URGENT CARE | Facility: CLINIC | Age: 24
End: 2019-05-22
Payer: COMMERCIAL

## 2019-05-22 VITALS
HEIGHT: 61 IN | TEMPERATURE: 99 F | DIASTOLIC BLOOD PRESSURE: 77 MMHG | WEIGHT: 203 LBS | BODY MASS INDEX: 38.33 KG/M2 | RESPIRATION RATE: 18 BRPM | SYSTOLIC BLOOD PRESSURE: 129 MMHG | HEART RATE: 90 BPM | OXYGEN SATURATION: 98 %

## 2019-05-22 DIAGNOSIS — S66.911A HAND STRAIN, RIGHT, INITIAL ENCOUNTER: ICD-10-CM

## 2019-05-22 DIAGNOSIS — M79.641 HAND PAIN, RIGHT: Primary | ICD-10-CM

## 2019-05-22 PROCEDURE — 3008F PR BODY MASS INDEX (BMI) DOCUMENTED: ICD-10-PCS | Mod: CPTII,S$GLB,, | Performed by: PHYSICIAN ASSISTANT

## 2019-05-22 PROCEDURE — 99214 PR OFFICE/OUTPT VISIT, EST, LEVL IV, 30-39 MIN: ICD-10-PCS | Mod: S$GLB,,, | Performed by: PHYSICIAN ASSISTANT

## 2019-05-22 PROCEDURE — 3008F BODY MASS INDEX DOCD: CPT | Mod: CPTII,S$GLB,, | Performed by: PHYSICIAN ASSISTANT

## 2019-05-22 PROCEDURE — 99214 OFFICE O/P EST MOD 30 MIN: CPT | Mod: S$GLB,,, | Performed by: PHYSICIAN ASSISTANT

## 2019-05-22 PROCEDURE — 73130 XR HAND COMPLETE 3 VIEW RIGHT: ICD-10-PCS | Mod: RT,S$GLB,, | Performed by: RADIOLOGY

## 2019-05-22 PROCEDURE — 73130 X-RAY EXAM OF HAND: CPT | Mod: RT,S$GLB,, | Performed by: RADIOLOGY

## 2019-05-22 NOTE — LETTER
May 22, 2019      [facility] [address], [place] [address]  Phone: [phone]  Fax: [phone]       Patient: Milana Navarro   YOB: 1995  Date of Visit: 05/22/2019    To Whom It May Concern:    Blessing Navarro  was at [facility] on 05/22/2019. She may return to work on 5/22/19 with no restrictions. If you have any questions or concerns, or if I can be of further assistance, please do not hesitate to contact me.    Sincerely,    Bronwyn Denney PA-C

## 2019-05-22 NOTE — PROGRESS NOTES
"Subjective:       Patient ID: Milana Navarro is a 23 y.o. female.    Vitals:  height is 5' 1" (1.549 m) and weight is 92.1 kg (203 lb). Her temperature is 98.5 °F (36.9 °C). Her blood pressure is 129/77 and her pulse is 90. Her respiration is 18 and oxygen saturation is 98%.     Chief Complaint: Hand Pain    Ms. Navarro presents today with right hand pain, specifically at the 3rd MCP joint.  This started 4 days ago.  She does denies knowledge of trauma, but is a rough sleeper & reports bruising the next day on hand & arm.  She has wrapped it with an ace bandage.      Hand Pain    The incident occurred 3 to 5 days ago. The incident occurred at home. There was no injury mechanism. The pain is present in the right hand. The quality of the pain is described as burning (throbbing). The pain does not radiate. The pain is at a severity of 5/10. The pain is moderate. The pain has been intermittent since the incident. Pertinent negatives include no chest pain. Nothing aggravates the symptoms. She has tried NSAIDs for the symptoms. The treatment provided mild relief.       Constitution: Negative for chills, fatigue and fever.   HENT: Negative for congestion and sore throat.    Neck: Negative for painful lymph nodes.   Cardiovascular: Negative for chest pain and leg swelling.   Eyes: Negative for double vision and blurred vision.   Respiratory: Negative for cough and shortness of breath.    Gastrointestinal: Negative for nausea, vomiting and diarrhea.   Genitourinary: Negative for dysuria, frequency, urgency and history of kidney stones.   Musculoskeletal: Positive for pain. Negative for joint pain, joint swelling, muscle cramps and muscle ache.        Right hand   Skin: Negative for color change, pale, rash and bruising.   Allergic/Immunologic: Negative for seasonal allergies.   Neurological: Negative for dizziness, history of vertigo, light-headedness, passing out and headaches.   Hematologic/Lymphatic: Negative for swollen " lymph nodes.   Psychiatric/Behavioral: Negative for nervous/anxious, sleep disturbance and depression. The patient is not nervous/anxious.        Objective:      Physical Exam   Constitutional: She is oriented to person, place, and time. She appears well-developed and well-nourished. She is cooperative.  Non-toxic appearance. She does not appear ill. No distress.   HENT:   Head: Normocephalic and atraumatic.   Right Ear: Hearing, tympanic membrane, external ear and ear canal normal.   Left Ear: Hearing, tympanic membrane, external ear and ear canal normal.   Nose: Nose normal. No mucosal edema, rhinorrhea or nasal deformity. No epistaxis. Right sinus exhibits no maxillary sinus tenderness and no frontal sinus tenderness. Left sinus exhibits no maxillary sinus tenderness and no frontal sinus tenderness.   Mouth/Throat: Uvula is midline, oropharynx is clear and moist and mucous membranes are normal. No trismus in the jaw. Normal dentition. No uvula swelling. No posterior oropharyngeal erythema.   Eyes: Conjunctivae and lids are normal. Right eye exhibits no discharge. Left eye exhibits no discharge. No scleral icterus.   Sclera clear bilat   Neck: Trachea normal, normal range of motion, full passive range of motion without pain and phonation normal. Neck supple.   Cardiovascular: Normal rate, regular rhythm, normal heart sounds, intact distal pulses and normal pulses.   Pulmonary/Chest: Effort normal and breath sounds normal. No respiratory distress.   Abdominal: Soft. Normal appearance and bowel sounds are normal. She exhibits no distension, no pulsatile midline mass and no mass. There is no tenderness.   Musculoskeletal: Normal range of motion. She exhibits no edema or deformity.        Arms:  Neurological: She is alert and oriented to person, place, and time. She exhibits normal muscle tone. Coordination normal.   Skin: Skin is warm, dry and intact. She is not diaphoretic. No pallor.   Psychiatric: She has a normal  mood and affect. Her speech is normal and behavior is normal. Judgment and thought content normal. Cognition and memory are normal.   Nursing note and vitals reviewed.      XR R hand - No significant radiographic abnormalities of the right hand.  Assessment:       1. Hand pain, right    2. Hand strain, right, initial encounter        Plan:         Hand pain, right  -     X-Ray Hand 3 View Right; Future; Expected date: 05/22/2019    Hand strain, right, initial encounter    Patient has ibuprofen 800mg at home, instructed to use PRN.     Patient Instructions   PLEASE READ YOUR DISCHARGE INSTRUCTIONS ENTIRELY AS IT CONTAINS IMPORTANT INFORMATION.  - Rest.    - Drink plenty of fluids.    - Tylenol or Ibuprofen as directed as needed for fever/pain.    - Follow up with your PCP or specialty clinic as directed in the next 1-2 weeks if not improved or as needed.  You can call [phone] to schedule an appointment with the appropriate provider.    - If you were prescribed antibiotics, please take them to completion.  - If you were prescribed a narcotic medication, do not drive or operate heavy equipment or machinery while taking these medications.  - If you  smoke, please stop smoking.  -You must understand that you've received an Urgent Care treatment only and that you may be released before all your medical problems are known or treated. You, the patient, will    arrange for follow up care as instructed.  - Please return to Urgent Care or to the Emergency Department if your symptoms worsen.    Patient aware and verbalized understanding.    Hand Sprain  A sprain is a stretching or tearing of the ligaments that hold a joint together. There are no broken bones. Sprains take 3 to 6 weeks to heal. A sprained hand may be treated with a splint or elastic wrap for support.  Home care  · Keep your arm elevated to reduce pain and swelling. This is most important during the first 48 hours.  · Apply an ice pack over the injured  area for 15 to 20 minutes every 3 to 6 hours. You should do this for the first 24 to 48 hours. You can make an ice pack by filling a plastic bag that seals at the top with ice cubes and then wrapping it with a thin towel. Continue the use of ice packs for relief of pain and swelling as needed. As the ice melts, be careful to avoid getting any wrap or splint wet. After 48 hours, apply heat (warm shower or warm bath) for 15 to 20 minutes several times a day, or alternate ice and heat.  · You may use over-the-counter pain medicine to control pain, unless another pain medicine was prescribed. If you have chronic liver or kidney disease or ever had a stomach ulcer or GI bleeding, talk with your healthcare provider before using these medicines.  · If you were given a splint or elastic wrap, wear it until your pain improves.  Follow-up care  Follow up with your healthcare provider as advised. Sometimes fractures dont show up on the first X-ray. Bruises and sprains can sometimes hurt as much as a fracture. These injuries can take time to heal completely. If your symptoms dont improve or they get worse, talk with your healthcare provider. You may need a repeat X-ray.  When to seek medical advice  Call your healthcare provider right away if any of these occur:  · Pain or swelling increases  · Fingers or hand becomes cold, blue, numb, or tingly  Date Last Reviewed: 11/20/2015  © 9647-2042 [employer]. [address] [place] [address], [place] [address]. All rights reserved. This information is not intended as a substitute for professional medical care. Always follow your healthcare professional's instructions.

## 2019-05-22 NOTE — PATIENT INSTRUCTIONS
PLEASE READ YOUR DISCHARGE INSTRUCTIONS ENTIRELY AS IT CONTAINS IMPORTANT INFORMATION.  - Rest.    - Drink plenty of fluids.    - Tylenol or Ibuprofen as directed as needed for fever/pain.    - Follow up with your PCP or specialty clinic as directed in the next 1-2 weeks if not improved or as needed.  You can call [phone] to schedule an appointment with the appropriate provider.    - If you were prescribed antibiotics, please take them to completion.  - If you were prescribed a narcotic medication, do not drive or operate heavy equipment or machinery while taking these medications.  - If you  smoke, please stop smoking.  -You must understand that you've received an Urgent Care treatment only and that you may be released before all your medical problems are known or treated. You, the patient, will    arrange for follow up care as instructed.  - Please return to Urgent Care or to the Emergency Department if your symptoms worsen.    Patient aware and verbalized understanding.    Hand Sprain  A sprain is a stretching or tearing of the ligaments that hold a joint together. There are no broken bones. Sprains take 3 to 6 weeks to heal. A sprained hand may be treated with a splint or elastic wrap for support.  Home care  · Keep your arm elevated to reduce pain and swelling. This is most important during the first 48 hours.  · Apply an ice pack over the injured area for 15 to 20 minutes every 3 to 6 hours. You should do this for the first 24 to 48 hours. You can make an ice pack by filling a plastic bag that seals at the top with ice cubes and then wrapping it with a thin towel. Continue the use of ice packs for relief of pain and swelling as needed. As the ice melts, be careful to avoid getting any wrap or splint wet. After 48 hours, apply heat (warm shower or warm bath) for 15 to 20 minutes several times a day, or alternate ice and heat.  · You may use over-the-counter pain medicine to control pain, unless another  pain medicine was prescribed. If you have chronic liver or kidney disease or ever had a stomach ulcer or GI bleeding, talk with your healthcare provider before using these medicines.  · If you were given a splint or elastic wrap, wear it until your pain improves.  Follow-up care  Follow up with your healthcare provider as advised. Sometimes fractures dont show up on the first X-ray. Bruises and sprains can sometimes hurt as much as a fracture. These injuries can take time to heal completely. If your symptoms dont improve or they get worse, talk with your healthcare provider. You may need a repeat X-ray.  When to seek medical advice  Call your healthcare provider right away if any of these occur:  · Pain or swelling increases  · Fingers or hand becomes cold, blue, numb, or tingly  Date Last Reviewed: 11/20/2015  © 6685-5932 [employer]. [address] [place] [address]. All rights reserved. This information is not intended as a substitute for professional medical care. Always follow your healthcare professional's instructions.

## 2020-01-27 ENCOUNTER — HOSPITAL ENCOUNTER (EMERGENCY)
Facility: HOSPITAL | Age: 25
Discharge: HOME | End: 2020-01-27
Attending: EMERGENCY MEDICINE
Payer: COMMERCIAL

## 2020-01-27 VITALS
OXYGEN SATURATION: 100 % | WEIGHT: 203 LBS | BODY MASS INDEX: 35.97 KG/M2 | RESPIRATION RATE: 18 BRPM | HEART RATE: 90 BPM | DIASTOLIC BLOOD PRESSURE: 80 MMHG | HEIGHT: 63 IN | TEMPERATURE: 98 F | SYSTOLIC BLOOD PRESSURE: 145 MMHG

## 2020-01-27 DIAGNOSIS — J02.9 EXUDATIVE PHARYNGITIS: Primary | ICD-10-CM

## 2020-01-27 LAB
B-HCG UR QL: NEGATIVE
CTP QC/QA: YES
POC MOLECULAR INFLUENZA A AGN: NEGATIVE
POC MOLECULAR INFLUENZA B AGN: NEGATIVE
S PYO RRNA THROAT QL PROBE: NEGATIVE

## 2020-01-27 PROCEDURE — 96372 THER/PROPH/DIAG INJ SC/IM: CPT | Mod: ER

## 2020-01-27 PROCEDURE — 25000003 PHARM REV CODE 250: Mod: ER | Performed by: EMERGENCY MEDICINE

## 2020-01-27 PROCEDURE — 63600175 PHARM REV CODE 636 W HCPCS: Mod: ER | Performed by: EMERGENCY MEDICINE

## 2020-01-27 PROCEDURE — 87081 CULTURE SCREEN ONLY: CPT

## 2020-01-27 PROCEDURE — 81025 URINE PREGNANCY TEST: CPT | Mod: ER | Performed by: EMERGENCY MEDICINE

## 2020-01-27 PROCEDURE — 99284 EMERGENCY DEPT VISIT MOD MDM: CPT | Mod: 25,ER

## 2020-01-27 PROCEDURE — 87880 STREP A ASSAY W/OPTIC: CPT | Mod: ER

## 2020-01-27 RX ORDER — IBUPROFEN 600 MG/1
600 TABLET ORAL EVERY 6 HOURS PRN
Qty: 20 TABLET | Refills: 0 | OUTPATIENT
Start: 2020-01-27 | End: 2020-02-20

## 2020-01-27 RX ORDER — ACETAMINOPHEN 500 MG
1000 TABLET ORAL EVERY 6 HOURS PRN
Qty: 30 TABLET | Refills: 0 | OUTPATIENT
Start: 2020-01-27 | End: 2022-03-10

## 2020-01-27 RX ORDER — ACETAMINOPHEN 325 MG/1
650 TABLET ORAL
Status: COMPLETED | OUTPATIENT
Start: 2020-01-27 | End: 2020-01-27

## 2020-01-27 RX ORDER — LIDOCAINE HYDROCHLORIDE 20 MG/ML
SOLUTION OROPHARYNGEAL
Qty: 100 ML | Refills: 0 | OUTPATIENT
Start: 2020-01-27 | End: 2022-03-10

## 2020-01-27 RX ADMIN — ACETAMINOPHEN 650 MG: 325 TABLET, FILM COATED ORAL at 08:01

## 2020-01-27 RX ADMIN — PENICILLIN G BENZATHINE 1.2 MILLION UNITS: 1200000 INJECTION, SUSPENSION INTRAMUSCULAR at 08:01

## 2020-01-29 LAB — BACTERIA THROAT CULT: NORMAL

## 2020-02-20 ENCOUNTER — HOSPITAL ENCOUNTER (EMERGENCY)
Facility: HOSPITAL | Age: 25
Discharge: HOME | End: 2020-02-20
Attending: INTERNAL MEDICINE
Payer: COMMERCIAL

## 2020-02-20 VITALS
BODY MASS INDEX: 38.98 KG/M2 | OXYGEN SATURATION: 100 % | SYSTOLIC BLOOD PRESSURE: 159 MMHG | DIASTOLIC BLOOD PRESSURE: 82 MMHG | TEMPERATURE: 98 F | RESPIRATION RATE: 18 BRPM | HEART RATE: 93 BPM | HEIGHT: 63 IN | WEIGHT: 220 LBS

## 2020-02-20 DIAGNOSIS — J06.9 ACUTE URI: Primary | ICD-10-CM

## 2020-02-20 LAB
B-HCG UR QL: NEGATIVE
CTP QC/QA: YES

## 2020-02-20 PROCEDURE — 81025 URINE PREGNANCY TEST: CPT | Mod: ER | Performed by: INTERNAL MEDICINE

## 2020-02-20 PROCEDURE — 99284 EMERGENCY DEPT VISIT MOD MDM: CPT | Mod: 25,ER

## 2020-02-20 RX ORDER — FLUTICASONE PROPIONATE 50 MCG
2 SPRAY, SUSPENSION (ML) NASAL DAILY
Qty: 15 G | Refills: 0 | OUTPATIENT
Start: 2020-02-20 | End: 2022-03-10

## 2020-02-20 RX ORDER — AZELASTINE 1 MG/ML
2 SPRAY, METERED NASAL 2 TIMES DAILY
Qty: 30 ML | Refills: 0 | OUTPATIENT
Start: 2020-02-20 | End: 2022-03-10

## 2020-02-20 RX ORDER — IBUPROFEN 800 MG/1
800 TABLET ORAL EVERY 8 HOURS PRN
Qty: 30 TABLET | Refills: 0 | Status: SHIPPED | OUTPATIENT
Start: 2020-02-20 | End: 2020-05-18 | Stop reason: SDUPTHER

## 2020-02-20 RX ORDER — OSELTAMIVIR PHOSPHATE 75 MG/1
75 CAPSULE ORAL 2 TIMES DAILY
Qty: 10 CAPSULE | Refills: 0 | Status: SHIPPED | OUTPATIENT
Start: 2020-02-20 | End: 2020-02-25

## 2020-02-20 NOTE — ED NOTES
24 y.o. Female to ED with c.o. Sinus congestion and head pressure x 4 days. Patient also endorses post nasal drip and mild dry cough. Patient denies n/v/d, fever/chills, chest pain/ shortness of breath.

## 2020-02-20 NOTE — ED PROVIDER NOTES
Encounter Date: 2/20/2020    SCRIBE #1 NOTE: I, Alfonso Perea, am scribing for, and in the presence of,  Dr. Barakat. I have scribed the following portions of the note - Other sections scribed: HPI, ROS, PE.       History     Chief Complaint   Patient presents with    sinus congestion     pt c/o sinus congestion for 4 days not getting any better.      Milana Navarro is a 24 y.o. female with history of PCOS who presents to the ED complaining of sinus congestion and 101 fever for 4 days. Patient is allergic to bactrim and corticosteroids. Patient reports she was diagnosed with strep several weeks ago and was prescribed penicillin. Patient has been taking dayquil.    The history is provided by the patient. No  was used.     Review of patient's allergies indicates:   Allergen Reactions    Bactrim [sulfamethoxazole-trimethoprim]     Corticosteroids (glucocorticoids) Dermatitis     Past Medical History:   Diagnosis Date    PCOS (polycystic ovarian syndrome)      Past Surgical History:   Procedure Laterality Date    ELBOW SURGERY      WISDOM TOOTH EXTRACTION       Family History   Problem Relation Age of Onset    Colon cancer Maternal Grandfather     Breast cancer Neg Hx     Ovarian cancer Neg Hx      Social History     Tobacco Use    Smoking status: Never Smoker    Smokeless tobacco: Never Used   Substance Use Topics    Alcohol use: No    Drug use: No     Review of Systems   Constitutional: Negative for fever (intermittent).   HENT: Positive for congestion. Negative for sore throat. Dental problem: sinus.    Respiratory: Negative for shortness of breath.    Cardiovascular: Negative for chest pain.   Gastrointestinal: Negative for nausea and vomiting.   Genitourinary: Negative for dysuria.   Musculoskeletal: Negative for back pain and myalgias.   Skin: Negative for rash.   Neurological: Negative for weakness.   Hematological: Negative for adenopathy.   Psychiatric/Behavioral: Negative for  behavioral problems.   All other systems reviewed and are negative.      Physical Exam     Initial Vitals [02/20/20 1053]   BP Pulse Resp Temp SpO2   (!) 159/82 93 18 98.1 °F (36.7 °C) 100 %      MAP       --         Physical Exam    Nursing note and vitals reviewed.  Constitutional: She appears well-developed and well-nourished.   HENT:   Head: Normocephalic and atraumatic.   Nose: Mucosal edema present.   Mouth/Throat: Uvula is midline and mucous membranes are normal. Posterior oropharyngeal erythema present. No oropharyngeal exudate or posterior oropharyngeal edema.   Clear nasal discharge and enlarged nasal turbinates noted.   Eyes: Right conjunctiva is injected. Left conjunctiva is injected.   Neck: Normal range of motion. Neck supple.   Cardiovascular: Normal rate, regular rhythm, S1 normal, S2 normal and normal heart sounds. Exam reveals no gallop and no friction rub.    No murmur heard.  Pulmonary/Chest: Effort normal and breath sounds normal. No respiratory distress. She has no decreased breath sounds. She has no wheezes. She has no rhonchi. She has no rales.   Heart rate at 83.   Abdominal: Soft. There is no tenderness.   Musculoskeletal: Normal range of motion. She exhibits no edema.   Neurological: She is alert and oriented to person, place, and time. GCS score is 15. GCS eye subscore is 4. GCS verbal subscore is 5. GCS motor subscore is 6.   Skin: Skin is warm and dry.   Psychiatric: She has a normal mood and affect.         ED Course   Procedures  Labs Reviewed   POCT URINE PREGNANCY          Imaging Results    None          Medical Decision Making:   History:   Old Medical Records: I decided to obtain old medical records.  Initial Assessment:   Milana Navarro is a 24 y.o. female with history of PCOS who presents to the ED complaining of sinus congestion and 101 fever for 4 days. Patient is allergic to bactrim and corticosteroids. Patient reports she was diagnosed with strep several weeks ago and was  prescribed penicillin. Patient has been taking dayquil.  Clinical Tests:   Lab Tests: Ordered and Reviewed  ED Management:  Patient was given instructions for viral URI and received prescriptions for Astelin/fluticasone/Tamiflu/ibuprofen.  She was advised to follow up with her primary care physician within the next week for re-evaluation/return to the emergency department if condition worsens.            Scribe Attestation:   Scribe #1: I performed the above scribed service and the documentation accurately describes the services I performed. I attest to the accuracy of the note.    This document was produced by a scribe under my direction and in my presence. I agree with the content of the note and have made any necessary edits.     Dr. Barakat    02/20/2020 6:09 PM                        Clinical Impression:     1. Acute URI            Disposition:   Disposition: Discharged  Condition: Stable                     Santana Barakat MD  02/20/20 3789

## 2020-02-26 ENCOUNTER — HOSPITAL ENCOUNTER (EMERGENCY)
Facility: HOSPITAL | Age: 25
Discharge: HOME | End: 2020-02-26
Attending: EMERGENCY MEDICINE
Payer: COMMERCIAL

## 2020-02-26 VITALS
TEMPERATURE: 98 F | OXYGEN SATURATION: 99 % | SYSTOLIC BLOOD PRESSURE: 178 MMHG | DIASTOLIC BLOOD PRESSURE: 87 MMHG | HEART RATE: 99 BPM | HEIGHT: 63 IN | WEIGHT: 220 LBS | RESPIRATION RATE: 18 BRPM | BODY MASS INDEX: 38.98 KG/M2

## 2020-02-26 DIAGNOSIS — N39.0 ACUTE URINARY TRACT INFECTION: ICD-10-CM

## 2020-02-26 DIAGNOSIS — J06.9 VIRAL URI: Primary | ICD-10-CM

## 2020-02-26 LAB
B-HCG UR QL: NEGATIVE
BILIRUBIN, POC UA: NEGATIVE
BLOOD, POC UA: ABNORMAL
CLARITY, POC UA: CLEAR
COLOR, POC UA: YELLOW
CTP QC/QA: YES
GLUCOSE, POC UA: NEGATIVE
KETONES, POC UA: NEGATIVE
LEUKOCYTE EST, POC UA: ABNORMAL
NITRITE, POC UA: NEGATIVE
PH UR STRIP: 6 [PH]
PROTEIN, POC UA: NEGATIVE
SPECIFIC GRAVITY, POC UA: >=1.03
UROBILINOGEN, POC UA: 0.2 E.U./DL

## 2020-02-26 PROCEDURE — 81025 URINE PREGNANCY TEST: CPT | Mod: ER | Performed by: EMERGENCY MEDICINE

## 2020-02-26 PROCEDURE — 99284 EMERGENCY DEPT VISIT MOD MDM: CPT | Mod: 25,ER

## 2020-02-26 PROCEDURE — 87086 URINE CULTURE/COLONY COUNT: CPT

## 2020-02-26 PROCEDURE — 81003 URINALYSIS AUTO W/O SCOPE: CPT | Mod: ER

## 2020-02-26 RX ORDER — AZELASTINE 1 MG/ML
2 SPRAY, METERED NASAL 2 TIMES DAILY
Qty: 30 ML | Refills: 0 | Status: SHIPPED | OUTPATIENT
Start: 2020-02-26 | End: 2020-03-18

## 2020-02-26 RX ORDER — NITROFURANTOIN 25; 75 MG/1; MG/1
100 CAPSULE ORAL 2 TIMES DAILY
Qty: 10 CAPSULE | Refills: 0 | Status: SHIPPED | OUTPATIENT
Start: 2020-02-26 | End: 2020-03-02

## 2020-02-26 NOTE — ED PROVIDER NOTES
Encounter Date: 2/26/2020       History     Chief Complaint   Patient presents with    Nasal Congestion     still feels bad after finishing flu meds. neg flu 2/20, chills nasal congestion denies fever     24-year-old female chief complaint runny nose congestion body aches and cough.  Patient was diagnosed with the flu last Thursday states she still feels bad.  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  Patient has not take anything for symptoms today.  Patient reports urinary frequency and pain with urination.        Review of patient's allergies indicates:   Allergen Reactions    Bactrim [sulfamethoxazole-trimethoprim]     Corticosteroids (glucocorticoids) Dermatitis     Past Medical History:   Diagnosis Date    Murmur     PCOS (polycystic ovarian syndrome)      Past Surgical History:   Procedure Laterality Date    ELBOW SURGERY      WISDOM TOOTH EXTRACTION       Family History   Problem Relation Age of Onset    Colon cancer Maternal Grandfather     Breast cancer Neg Hx     Ovarian cancer Neg Hx      Social History     Tobacco Use    Smoking status: Never Smoker    Smokeless tobacco: Never Used   Substance Use Topics    Alcohol use: No    Drug use: No     Review of Systems   Constitutional: Positive for chills. Negative for fever.   HENT: Positive for congestion. Negative for sore throat.    Respiratory: Positive for cough. Negative for shortness of breath.    Cardiovascular: Negative for chest pain.   Gastrointestinal: Negative for nausea.   Genitourinary: Positive for dysuria and frequency. Negative for flank pain.   Musculoskeletal: Negative for back pain.   Skin: Negative for rash.   Neurological: Negative for weakness.   Hematological: Does not bruise/bleed easily.   All other systems reviewed and are negative.      Physical Exam     Initial Vitals [02/26/20 0707]   BP Pulse Resp Temp SpO2   (!) 182/97 106 18 98.4 °F (36.9 °C) 100 %      MAP       --         Physical  Exam    Nursing note and vitals reviewed.  Constitutional: She appears well-developed and well-nourished.   HENT:   Head: Normocephalic and atraumatic.   Right Ear: External ear normal.   Left Ear: External ear normal.   Nose: Mucosal edema and rhinorrhea present. Right sinus exhibits no maxillary sinus tenderness. Left sinus exhibits no maxillary sinus tenderness.   Mouth/Throat: Uvula is midline, oropharynx is clear and moist and mucous membranes are normal.   Eyes: Conjunctivae and EOM are normal. Pupils are equal, round, and reactive to light. Right eye exhibits no discharge. Left eye exhibits no discharge.   Neck: Normal range of motion. Neck supple.   Cardiovascular: Normal rate, regular rhythm, normal heart sounds and intact distal pulses. Exam reveals no gallop and no friction rub.    No murmur heard.  Pulmonary/Chest: Breath sounds normal. No respiratory distress. She has no wheezes. She has no rhonchi. She has no rales. She exhibits no tenderness.   Abdominal: Soft. Bowel sounds are normal. She exhibits no distension and no mass. There is no tenderness. There is no rebound and no guarding.   No CVA tenderness   Musculoskeletal: Normal range of motion. She exhibits no edema or tenderness.   Neurological: She is alert and oriented to person, place, and time. She has normal strength and normal reflexes. She displays normal reflexes. No cranial nerve deficit or sensory deficit.   Skin: Skin is warm and dry. No rash and no abscess noted. No erythema. No pallor.   Psychiatric: She has a normal mood and affect.         ED Course   Procedures  Labs Reviewed   POCT URINALYSIS W/O SCOPE - Abnormal; Notable for the following components:       Result Value    Spec Grav UA >=1.030 (*)     Blood, UA Trace-intact (*)     Leukocytes, UA 1+ (*)     All other components within normal limits   CULTURE, URINE   POCT URINE PREGNANCY   POCT URINALYSIS W/O SCOPE          Imaging Results          X-Ray Chest PA And Lateral (Final  result)  Result time 02/26/20 07:44:01    Final result by Jan Morin MD (02/26/20 07:44:01)                 Impression:      Mild diminished depth of inspiration and radiographic appearance of crowding without evidence for superimposed acute intrathoracic process.      Electronically signed by: Jan Morin  Date:    02/26/2020  Time:    07:44             Narrative:    EXAMINATION:  XR CHEST PA AND LATERAL    CLINICAL HISTORY:  cough;    TECHNIQUE:  PA and lateral views of the chest were performed.    COMPARISON:  None    FINDINGS:  Two views of the chest are submitted.  There is mild rotation.  The cardiomediastinal silhouette appears appropriate.  There is mild diminished depth of inspiration with accentuation of pulmonary bronchovascular markings, when accounting for this there is no evidence for confluent infiltrate or consolidation, significant pleural effusion or pneumothorax.                                                      Medical decision making   Chief complaint:  Runny nose congestion, and dysuria patient not feeling better since she had the flu  Differential diagnosis:  Pregnancy, urinary tract infection, URI, bronchitis, pneumonia  Treatment in the ED Physical Exam.  Discussed labs, and imaging results.    Fill and take prescriptions as directed.  Return to the ED if symptoms worsen or do not resolve.   Answered questions and discussed discharge plan.    Patient feels better and is ready for discharge.  Follow up with PCP/specialist in 1 day.             Clinical Impression:       ICD-10-CM ICD-9-CM   1. Viral URI J06.9 465.9   2. Acute urinary tract infection N39.0 599.0                                Lissette Graham DO  02/26/20 0758

## 2020-02-27 ENCOUNTER — TELEPHONE (OUTPATIENT)
Dept: EMERGENCY MEDICINE | Facility: HOSPITAL | Age: 25
End: 2020-02-27

## 2020-02-28 LAB — BACTERIA UR CULT: NORMAL

## 2020-05-18 ENCOUNTER — HOSPITAL ENCOUNTER (EMERGENCY)
Facility: HOSPITAL | Age: 25
Discharge: HOME | End: 2020-05-18
Attending: EMERGENCY MEDICINE
Payer: COMMERCIAL

## 2020-05-18 VITALS
OXYGEN SATURATION: 98 % | SYSTOLIC BLOOD PRESSURE: 147 MMHG | HEIGHT: 63 IN | TEMPERATURE: 99 F | BODY MASS INDEX: 37.21 KG/M2 | WEIGHT: 210 LBS | DIASTOLIC BLOOD PRESSURE: 102 MMHG | HEART RATE: 102 BPM | RESPIRATION RATE: 18 BRPM

## 2020-05-18 DIAGNOSIS — S86.912A KNEE STRAIN, LEFT, INITIAL ENCOUNTER: Primary | ICD-10-CM

## 2020-05-18 DIAGNOSIS — M25.569 KNEE PAIN: ICD-10-CM

## 2020-05-18 LAB
B-HCG UR QL: NEGATIVE
CTP QC/QA: YES

## 2020-05-18 PROCEDURE — 81025 URINE PREGNANCY TEST: CPT | Mod: ER | Performed by: EMERGENCY MEDICINE

## 2020-05-18 PROCEDURE — 99284 EMERGENCY DEPT VISIT MOD MDM: CPT | Mod: 25,ER

## 2020-05-18 PROCEDURE — 29505 APPLICATION LONG LEG SPLINT: CPT

## 2020-05-18 RX ORDER — TRAMADOL HYDROCHLORIDE 50 MG/1
50 TABLET ORAL EVERY 4 HOURS PRN
Qty: 15 TABLET | Refills: 0 | Status: SHIPPED | OUTPATIENT
Start: 2020-05-18 | End: 2020-05-21

## 2020-05-18 RX ORDER — IBUPROFEN 800 MG/1
800 TABLET ORAL EVERY 8 HOURS PRN
Qty: 30 TABLET | Refills: 0 | OUTPATIENT
Start: 2020-05-18 | End: 2022-03-10

## 2020-05-18 NOTE — ED TRIAGE NOTES
Pt states she [place] and twisted left knee 3wks ago.  States she has had pain since.  States she has some swelling.  Pt has been wearing pull on brace that seems to help some.

## 2020-05-18 NOTE — ED PROVIDER NOTES
Encounter Date: 5/18/2020    SCRIBE #1 NOTE: I, Ameena Chaney , am scribing for, and in the presence of,  Dr. Holly . I have scribed the following portions of the note - Other sections scribed: HPI, ROS, PE .       History     Chief Complaint   Patient presents with    Knee Pain     TRIP AND FELL X 3 WEEKS AGO; NOW WITH PAIN AND SWELLING TO LEFT KNEE; ALSO REPORTS BURNING ORIGINATING AT INJURED KNEE RADIATING TO FOOT;      24 y.o. female with no medical problems says she stepped in a hole and twisted her left knee 3 weeks ago. 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 She still feels the knee is swollen and difficult to bend so she wanted to get it checked out. She has been taking Tylenol at home with mixed results. She has no other injuries.     The history is provided by the patient.     Review of patient's allergies indicates:   Allergen Reactions    Bactrim [sulfamethoxazole-trimethoprim]     Corticosteroids (glucocorticoids) Dermatitis     Past Medical History:   Diagnosis Date    Murmur     PCOS (polycystic ovarian syndrome)      Past Surgical History:   Procedure Laterality Date    ELBOW SURGERY      WISDOM TOOTH EXTRACTION       Family History   Problem Relation Age of Onset    Colon cancer Maternal Grandfather     Breast cancer Neg Hx     Ovarian cancer Neg Hx      Social History     Tobacco Use    Smoking status: Never Smoker    Smokeless tobacco: Never Used   Substance Use Topics    Alcohol use: No    Drug use: No     Review of Systems   Constitutional: Negative for fever.   HENT: Negative for sore throat.    Eyes: Negative for visual disturbance.   Respiratory: Negative for shortness of breath.    Cardiovascular: Negative for chest pain.   Gastrointestinal: Negative for abdominal pain.   Genitourinary: Negative for dysuria.   Musculoskeletal: Positive for arthralgias (left knee) and joint swelling (left knee). Negative  for back pain.   Skin: Negative for rash.   Neurological: Negative for headaches.       Physical Exam     Initial Vitals [05/18/20 0929]   BP Pulse Resp Temp SpO2   (!) 155/87 107 18 98.5 °F (36.9 °C) 97 %      MAP       --         Physical Exam    Nursing note and vitals reviewed.  Constitutional: She appears well-developed and well-nourished.   HENT:   Head: Normocephalic and atraumatic.   Right Ear: External ear normal.   Left Ear: External ear normal.   Nose: Nose normal.   Eyes: Conjunctivae and EOM are normal.   Neck: Normal range of motion. Neck supple. No thyromegaly present. No JVD present.   Cardiovascular: Normal rate and regular rhythm. Exam reveals no gallop and no friction rub.    No murmur heard.  Pulmonary/Chest: Breath sounds normal. No respiratory distress.   Abdominal: Soft. Bowel sounds are normal. There is no tenderness.   Musculoskeletal: She exhibits no edema or tenderness.        Left knee: She exhibits swelling (minimal) and effusion (minimal). She exhibits normal range of motion (ROM to 90 degrees with discomfort) and no bony tenderness.   No anterior or lateral instability. Pain with lateral meniscal stress by rotating the knee.    Neurological: She is alert and oriented to person, place, and time. She has normal strength. GCS score is 15. GCS eye subscore is 4. GCS verbal subscore is 5. GCS motor subscore is 6.   Skin: Skin is warm and dry. Capillary refill takes less than 2 seconds.   Psychiatric: She has a normal mood and affect.         ED Course   Procedures  Labs Reviewed   POCT URINE PREGNANCY          Imaging Results          X-Ray Knee 3 View Left (Final result)  Result time 05/18/20 10:37:17    Final result by Ronnie Arriola MD (05/18/20 10:37:17)                 Impression:      1. No acute displaced fracture, dislocation or suspicious osseous lesion.      Electronically signed by: Ronnie Arriola  Date:    05/18/2020  Time:    10:37             Narrative:    EXAMINATION:  XR KNEE  3 VIEW LEFT    CLINICAL HISTORY:  Knee pain, not otherwise specified.    TECHNIQUE:  3 views of the left knee    COMPARISON:  None    FINDINGS:  No acute displaced fracture, dislocation or suspicious osseous lesion. Anatomic alignment is maintained.    Regional soft tissues are grossly unremarkable.                              X-Rays:   Independently Interpreted Readings:   Other Readings:  Knee x-ray:  No fracture or dislocation    Medical Decision Making:   Initial Assessment:   X-ray is normal. Clinically suspect meniscal tear refer to orthopedics, treat with knee immobilizer, crutches for rest, antiinflammatories and breakthrough pain medication as needed. .   Clinical Tests:   Lab Tests: Ordered and Reviewed  Radiological Study: Ordered and Reviewed            Scribe Attestation:   Scribe #1: I performed the above scribed service and the documentation accurately describes the services I performed. I attest to the accuracy of the note.    I, Andrew Holly, personally performed the services described in this documentation. All medical record entries made by the scribe were at my direction and in my presence.  I have reviewed the chart and agree that the record reflects my personal performance and is accurate and complete                          Clinical Impression:     1. Knee strain, left, initial encounter    2. Knee pain                ED Disposition Condition    Discharge Stable        ED Prescriptions     Medication Sig Dispense Start Date End Date Auth. Provider    ibuprofen (ADVIL,MOTRIN) 800 MG tablet Take 1 tablet (800 mg total) by mouth every 8 (eight) hours as needed for Pain. 30 tablet 5/18/2020  Andrew Holly MD    traMADoL (ULTRAM) 50 mg tablet Take 1 tablet (50 mg total) by mouth every 4 (four) hours as needed (breeakthrough pain). 15 tablet 5/18/2020 5/21/2020 Andrew Holly MD        Follow-up Information     Follow up With Specialties Details Why Contact Info    Geoff JUDGE  MD [facility] Schedule an appointment as soon as possible for a visit   [address]  [address]  [place] [address]  [phone]                                       Andrew Holly MD  05/18/20 3725

## 2020-05-18 NOTE — ED NOTES
Appearance:  Pt awake, alert & oriented to person, place & time.  Pt in no acute distress at present time.  Skin:  Skin warm, dry & intact.  Mucous membranes moist.  Skin turgor normal.  Respiratory:  Respirations even, non-labored.    Neurologic:  Pt moving all extremities without difficulty.  Sensation intact.     Peripheral Vascular:  All peripheral pulses present.  Musculoskeletal:  Anterior & posterior tenderness.  Pain with ROM.

## 2021-10-15 ENCOUNTER — HOSPITAL ENCOUNTER (EMERGENCY)
Facility: HOSPITAL | Age: 26
Discharge: HOME | End: 2021-10-15
Attending: EMERGENCY MEDICINE
Payer: COMMERCIAL

## 2021-10-15 VITALS
HEART RATE: 75 BPM | TEMPERATURE: 98 F | RESPIRATION RATE: 18 BRPM | BODY MASS INDEX: 39.87 KG/M2 | WEIGHT: 225 LBS | OXYGEN SATURATION: 100 % | DIASTOLIC BLOOD PRESSURE: 74 MMHG | SYSTOLIC BLOOD PRESSURE: 128 MMHG | HEIGHT: 63 IN

## 2021-10-15 DIAGNOSIS — R10.9 LEFT SIDED ABDOMINAL PAIN: ICD-10-CM

## 2021-10-15 DIAGNOSIS — S00.83XA CONTUSION OF FOREHEAD, INITIAL ENCOUNTER: ICD-10-CM
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
## 2021-10-15 DIAGNOSIS — S43.402A SPRAIN OF LEFT SHOULDER, UNSPECIFIED SHOULDER SPRAIN TYPE, INITIAL ENCOUNTER: ICD-10-CM

## 2021-10-15 LAB
ALBUMIN SERPL-MCNC: 3.8 G/DL (ref 3.3–5.5)
ALP SERPL-CCNC: 75 U/L (ref 42–141)
B-HCG UR QL: NEGATIVE
BILIRUB SERPL-MCNC: 0.6 MG/DL (ref 0.2–1.6)
BUN SERPL-MCNC: 12 MG/DL (ref 7–22)
CALCIUM SERPL-MCNC: 9.4 MG/DL (ref 8–10.3)
CHLORIDE SERPL-SCNC: 104 MMOL/L (ref 98–108)
CREAT SERPL-MCNC: 0.7 MG/DL (ref 0.6–1.2)
CTP QC/QA: YES
GLUCOSE SERPL-MCNC: 121 MG/DL (ref 73–118)
POC ALT (SGPT): 32 U/L (ref 10–47)
POC AST (SGOT): 29 U/L (ref 11–38)
POC TCO2: 26 MMOL/L (ref 18–33)
POTASSIUM BLD-SCNC: 3.8 MMOL/L (ref 3.6–5.1)
PROTEIN, POC: 7.3 G/DL (ref 6.4–8.1)
SODIUM BLD-SCNC: 142 MMOL/L (ref 128–145)

## 2021-10-15 PROCEDURE — 96374 THER/PROPH/DIAG INJ IV PUSH: CPT | Mod: ER,59

## 2021-10-15 PROCEDURE — 81025 URINE PREGNANCY TEST: CPT | Mod: ER | Performed by: EMERGENCY MEDICINE

## 2021-10-15 PROCEDURE — 25500020 PHARM REV CODE 255: Mod: ER

## 2021-10-15 PROCEDURE — 80053 COMPREHEN METABOLIC PANEL: CPT | Mod: ER

## 2021-10-15 PROCEDURE — 85025 COMPLETE CBC W/AUTO DIFF WBC: CPT | Mod: ER

## 2021-10-15 PROCEDURE — 63600175 PHARM REV CODE 636 W HCPCS: Mod: ER | Performed by: EMERGENCY MEDICINE

## 2021-10-15 PROCEDURE — 99285 EMERGENCY DEPT VISIT HI MDM: CPT | Mod: 25,ER

## 2021-10-15 RX ORDER — NAPROXEN 500 MG/1
500 TABLET ORAL 2 TIMES DAILY WITH MEALS
Qty: 14 TABLET | Refills: 0 | Status: SHIPPED | OUTPATIENT
Start: 2021-10-15 | End: 2021-10-22

## 2021-10-15 RX ORDER — KETOROLAC TROMETHAMINE 30 MG/ML
15 INJECTION, SOLUTION INTRAMUSCULAR; INTRAVENOUS
Status: COMPLETED | OUTPATIENT
Start: 2021-10-15 | End: 2021-10-15

## 2021-10-15 RX ADMIN — IOHEXOL 100 ML: 350 INJECTION, SOLUTION INTRAVENOUS at 11:10

## 2021-10-15 RX ADMIN — KETOROLAC TROMETHAMINE 15 MG: 30 INJECTION, SOLUTION INTRAMUSCULAR at 10:10

## 2022-03-10 ENCOUNTER — HOSPITAL ENCOUNTER (EMERGENCY)
Facility: HOSPITAL | Age: 27
Discharge: HOME | End: 2022-03-10
Attending: EMERGENCY MEDICINE
Payer: COMMERCIAL

## 2022-03-10 VITALS
TEMPERATURE: 99 F | DIASTOLIC BLOOD PRESSURE: 82 MMHG | OXYGEN SATURATION: 99 % | RESPIRATION RATE: 16 BRPM | WEIGHT: 230 LBS | SYSTOLIC BLOOD PRESSURE: 130 MMHG | HEART RATE: 93 BPM | BODY MASS INDEX: 40.74 KG/M2

## 2022-03-10 DIAGNOSIS — Z02.89 ENCOUNTER TO OBTAIN EXCUSE FROM WORK: Primary | ICD-10-CM

## 2022-03-10 DIAGNOSIS — R19.7 NAUSEA, VOMITING, AND DIARRHEA: ICD-10-CM

## 2022-03-10 DIAGNOSIS — R11.2 NAUSEA, VOMITING, AND DIARRHEA: ICD-10-CM

## 2022-03-10 LAB
B-HCG UR QL: NEGATIVE
CTP QC/QA: YES

## 2022-03-10 PROCEDURE — 81025 URINE PREGNANCY TEST: CPT | Mod: ER | Performed by: EMERGENCY MEDICINE

## 2022-03-10 PROCEDURE — 99282 EMERGENCY DEPT VISIT SF MDM: CPT | Mod: ER

## 2022-03-10 RX ORDER — MEDROXYPROGESTERONE ACETATE 10 MG/1
TABLET ORAL
COMMUNITY
Start: 2021-12-17 | End: 2023-04-27

## 2022-03-10 RX ORDER — METFORMIN HYDROCHLORIDE 500 MG/1
TABLET ORAL
COMMUNITY
Start: 2021-12-17 | End: 2023-01-27 | Stop reason: SDUPTHER

## 2022-03-10 NOTE — FIRST PROVIDER EVALUATION
Emergency Department TeleTriage Encounter Note      CHIEF COMPLAINT    Chief Complaint   Patient presents with    Vomiting     RESOLVED N/V/D LAST NIGHT AFTER DINNER; STATES SHE NEEDS A NOTE TO GO BACK TO WORK; DENIES SYMPTOMS AT THIS TIME WITH MEDICATION       VITAL SIGNS   Initial Vitals [03/10/22 1146]   BP Pulse Resp Temp SpO2   135/84 95 18 98.8 °F (37.1 °C) 97 %      MAP       --            ALLERGIES    Review of patient's allergies indicates:   Allergen Reactions    Bactrim [sulfamethoxazole-trimethoprim]     Corticosteroids (glucocorticoids) Dermatitis       PROVIDER TRIAGE NOTE  This is a teletriage evaluation of a 26 y.o. female presenting to the ED needing a note to return to work after she and some friends became ill last night with N/V/D.  This has now resolved and the patient denies any sxs.    Initial orders will be placed and care will be transferred to an alternate provider when patient is roomed for a full evaluation. Any additional orders and the final disposition will be determined by that provider.           ORDERS  Labs Reviewed   POCT URINE PREGNANCY       ED Orders (720h ago, onward)    Start Ordered     Status Ordering Provider    03/10/22 1149 03/10/22 1149  POCT urine pregnancy  Once         Ordered CONNIE PABLO            Virtual Visit Note: The provider triage portion of this emergency department evaluation and documentation was performed via [employer], a HIPAA-compliant telemedicine application, in concert with a tele-presenter in the room. A face to face patient evaluation with one of my colleagues will occur once the patient is placed in an emergency department room.      DISCLAIMER: This note was prepared with [employer] voice recognition transcription software. Garbled syntax, mangled pronouns, and other bizarre constructions may be attributed to that software system.

## 2022-03-10 NOTE — Clinical Note
"Milana Rinaldi" Ramon was seen and treated in our emergency department on 3/10/2022.  She may return to work on 03/11/2022.       If you have any questions or concerns, please don't hesitate to call.      ANKITA Mcgrath"

## 2022-03-10 NOTE — ED PROVIDER NOTES
Encounter Date: 3/10/2022    SCRIBE #1 NOTE: I, Geoff Yair, am scribing for, and in the presence of, ANKITA Wesley.       History     Chief Complaint   Patient presents with    Vomiting     RESOLVED N/V/D LAST NIGHT AFTER DINNER; STATES SHE NEEDS A NOTE TO GO BACK TO WORK; DENIES SYMPTOMS AT THIS TIME WITH MEDICATION     26 year old female who presents to the ED with complaints of vomiting and diarrhea. Patient reports she went out to dinner last night with some friends and began experiencing vomiting and diarrhea shortly after dinner. Notes other friends also experienced similar symptoms. She took two Immodium's and one Benadryl last night with relief. Symptoms have since resolved but patient needs a doctor's note to return to work. Patient denies rash, fever, chest pain, SOB, numbness, weakness, tingling, abdominal pain, back pain, dysuria, hematuria, or any other complaints. Currently on Progestin for PCOS. No other complaints at this time.      The history is provided by the patient. No  was used.     Review of patient's allergies indicates:   Allergen Reactions    Bactrim [sulfamethoxazole-trimethoprim]     Corticosteroids (glucocorticoids) Dermatitis     Past Medical History:   Diagnosis Date    Murmur     PCOS (polycystic ovarian syndrome)      Past Surgical History:   Procedure Laterality Date    ELBOW SURGERY      KNEE SURGERY      WISDOM TOOTH EXTRACTION       Family History   Problem Relation Age of Onset    Colon cancer Maternal Grandfather     Breast cancer Neg Hx     Ovarian cancer Neg Hx      Social History     Tobacco Use    Smoking status: Never Smoker    Smokeless tobacco: Never Used   Substance Use Topics    Alcohol use: No    Drug use: No     Review of Systems   Constitutional: Negative for chills and fever.   HENT: Negative for congestion, ear pain, rhinorrhea, sore throat and trouble swallowing.    Eyes: Negative for pain, discharge and redness.    Respiratory: Negative for cough and shortness of breath.    Cardiovascular: Negative for chest pain.   Gastrointestinal: Positive for diarrhea, nausea and vomiting. Negative for abdominal pain.   Genitourinary: Negative for decreased urine volume and dysuria.   Musculoskeletal: Negative for back pain, neck pain and neck stiffness.   Skin: Negative for rash.   Neurological: Negative for dizziness, weakness, light-headedness, numbness and headaches.   Psychiatric/Behavioral: Negative for confusion.       Physical Exam     Initial Vitals [03/10/22 1146]   BP Pulse Resp Temp SpO2   135/84 95 18 98.8 °F (37.1 °C) 97 %      MAP       --         Physical Exam    Nursing note and vitals reviewed.  Constitutional: Vital signs are normal. She appears well-developed.  Non-toxic appearance. She does not appear ill.   HENT:   Head: Normocephalic and atraumatic.   Right Ear: External ear normal.   Left Ear: External ear normal.   Nose: Nose normal.   Mouth/Throat: Oropharynx is clear and moist.   Eyes: Conjunctivae are normal.   Neck:   Normal range of motion.  Cardiovascular: Normal rate and regular rhythm.   Pulmonary/Chest: Effort normal and breath sounds normal. She exhibits no tenderness.   Abdominal: Abdomen is soft. There is no abdominal tenderness.   Musculoskeletal:      Cervical back: Normal range of motion.     Neurological: She is alert and oriented to person, place, and time. Gait normal. GCS eye subscore is 4. GCS verbal subscore is 5. GCS motor subscore is 6.   Skin: Skin is warm, dry and intact. No rash noted.   Psychiatric: She has a normal mood and affect. Her speech is normal and behavior is normal. Judgment and thought content normal.         ED Course   Procedures  Labs Reviewed   POCT URINE PREGNANCY          Imaging Results    None          Medications - No data to display  Medical Decision Making:   History:   Old Medical Records: I decided to obtain old medical records.       APC / Resident Notes:    This is an evaluation of a 26 y.o. female that presents to the Emergency Department for resolved N/V/D, work excuse    Physical Exam shows a non-toxic, afebrile, and well appearing female. Normal PE with soft non-tender abdomen    Vital signs are reassuring. If available, previous records reviewed.   RESULTS: UPT negative    My overall impression is N/V/D, work excuse. I considered, but at this time, do not suspect intractable vomiting, intractable diarrhea, pregnancy, viral illness.    ED Course: PE. Discharge Meds/Instructions: None. The diagnosis, treatment plan, instructions for follow-up as well as ED return precautions were discussed. All questions have been answered.          Scribe Attestation:   Scribe #1: I performed the above scribed service and the documentation accurately describes the services I performed. I attest to the accuracy of the note.                 I, ELIJAH Chan, personally performed the services described in this documentation. All medical record entries made by the scribe were at my direction and in my presence. I have reviewed the chart and agree that the record reflects my personal performance and is accurate and complete.    Clinical Impression:   Final diagnoses:  [R11.2, R19.7] Nausea, vomiting, and diarrhea - resolved  [Z02.89] Encounter to obtain excuse from work (Primary)          ED Disposition Condition    Discharge Stable        ED Prescriptions     None        Follow-up Information     Follow up With Specialties Details Why Contact Info    Anton Rondon MD Family Medicine Schedule an appointment as soon as possible for a visit  As needed [address] [facility]  [place] [address]  [phone]             ANKITA Mcgrath  03/10/22 5088

## 2022-08-12 ENCOUNTER — HOSPITAL ENCOUNTER (EMERGENCY)
Facility: HOSPITAL | Age: 27
Discharge: HOME | End: 2022-08-12
Attending: EMERGENCY MEDICINE
Payer: COMMERCIAL

## 2022-08-12 VITALS
HEART RATE: 110 BPM | HEIGHT: 63 IN | TEMPERATURE: 99 F | RESPIRATION RATE: 18 BRPM | OXYGEN SATURATION: 99 % | BODY MASS INDEX: 40.75 KG/M2 | SYSTOLIC BLOOD PRESSURE: 140 MMHG | DIASTOLIC BLOOD PRESSURE: 81 MMHG | WEIGHT: 230 LBS

## 2022-08-12 DIAGNOSIS — T14.90XA INJURY: ICD-10-CM

## 2022-08-12 DIAGNOSIS — S86.912A KNEE STRAIN, LEFT, INITIAL ENCOUNTER: Primary | ICD-10-CM

## 2022-08-12 LAB
B-HCG UR QL: NEGATIVE
CTP QC/QA: YES

## 2022-08-12 PROCEDURE — 81025 URINE PREGNANCY TEST: CPT | Mod: ER | Performed by: EMERGENCY MEDICINE

## 2022-08-12 PROCEDURE — 99284 EMERGENCY DEPT VISIT MOD MDM: CPT | Mod: 25,ER

## 2022-08-12 RX ORDER — METHOCARBAMOL 500 MG/1
1000 TABLET, FILM COATED ORAL 3 TIMES DAILY
Qty: 30 TABLET | Refills: 0 | Status: SHIPPED | OUTPATIENT
Start: 2022-08-12 | End: 2022-08-17

## 2022-08-12 RX ORDER — IBUPROFEN 600 MG/1
600 TABLET ORAL EVERY 6 HOURS PRN
Qty: 20 TABLET | Refills: 0 | Status: SHIPPED | OUTPATIENT
Start: 2022-08-12 | End: 2022-08-17

## 2022-08-12 RX ORDER — DEXTROMETHORPHAN HYDROBROMIDE, GUAIFENESIN 5; 100 MG/5ML; MG/5ML
650 LIQUID ORAL EVERY 8 HOURS
Qty: 20 TABLET | Refills: 0 | Status: SHIPPED | OUTPATIENT
Start: 2022-08-12 | End: 2022-08-17

## 2022-08-12 NOTE — ED PROVIDER NOTES
"Encounter Date: 8/12/2022    SCRIBE #1 NOTE: I, Priscila Bhatt, am scribing for, and in the presence of,  ANKITA Chan. I have scribed the following portions of the note - Other sections scribed: HPI; ROS; PE.       History     Chief Complaint   Patient presents with    Knee Pain     Pt c/o left knee pain onset one hour ago when she "tweaked" it while getting into car.  Reports pain with weight bearing.     Milana Navarro is a 26 y.o. female with no pertinent medical Hx who presents to the ED for chief complaint of left knee pain onset today. Patient reports she was attempting to get into her vehicle when she "tweaked" her knee. She states she's had 2 previous surgeries to the left knee. Patient reports burning knee pain. She states she called her surgeon and was told to come to ED. She notes she is allergic to Bactrim. Patient denies rash, fever, chest pain, SOB, numbness, weakness, tingling, abdominal pain, back pain, dysuria, hematuria, nausea, vomiting, diarrhea, or any other complaints. Patient rates her pain as 5/10 and has not taken any medications for the symptoms. No alleviating/aggravating factors.  She does not use tobacco, EtOH, or recreational drugs.             The history is provided by the patient. No  was used.     Review of patient's allergies indicates:   Allergen Reactions    Bactrim [sulfamethoxazole-trimethoprim]     Corticosteroids (glucocorticoids) Dermatitis     Past Medical History:   Diagnosis Date    Murmur     PCOS (polycystic ovarian syndrome)      Past Surgical History:   Procedure Laterality Date    ELBOW SURGERY      KNEE SURGERY      WISDOM TOOTH EXTRACTION       Family History   Problem Relation Age of Onset    Colon cancer Maternal Grandfather     Breast cancer Neg Hx     Ovarian cancer Neg Hx      Social History     Tobacco Use    Smoking status: Never Smoker    Smokeless tobacco: Never Used   Substance Use Topics    Alcohol use: No    Drug " use: No     Review of Systems   Constitutional: Negative for chills and fever.   HENT: Negative for congestion, ear pain, rhinorrhea, sore throat and trouble swallowing.    Eyes: Negative for pain, discharge and redness.   Respiratory: Negative for cough and shortness of breath.    Cardiovascular: Negative for chest pain.   Gastrointestinal: Negative for abdominal pain, diarrhea, nausea and vomiting.   Genitourinary: Negative for decreased urine volume and dysuria.   Musculoskeletal: Positive for arthralgias (left knee). Negative for back pain, neck pain and neck stiffness.   Skin: Negative for rash.   Neurological: Negative for dizziness, weakness, light-headedness, numbness and headaches.   Psychiatric/Behavioral: Negative for confusion.       Physical Exam     Initial Vitals   BP Pulse Resp Temp SpO2   08/12/22 1132 08/12/22 1132 08/12/22 1132 08/12/22 1135 08/12/22 1132   134/73 110 20 98.7 °F (37.1 °C) 99 %      MAP       --                Physical Exam    Nursing note and vitals reviewed.  Constitutional: Vital signs are normal. She appears well-developed.  Non-toxic appearance. She does not appear ill.   HENT:   Head: Normocephalic and atraumatic.   Right Ear: External ear normal.   Left Ear: External ear normal.   Nose: Nose normal.   Mouth/Throat: Oropharynx is clear and moist.   Eyes: Conjunctivae are normal.   Neck:   Normal range of motion.  Cardiovascular: Normal rate and regular rhythm.   Pulmonary/Chest: Effort normal and breath sounds normal. She exhibits no tenderness.   Abdominal: Abdomen is soft. There is no abdominal tenderness.   Musculoskeletal:      Cervical back: Normal range of motion.      Left knee: No swelling, deformity, erythema or ecchymosis. Normal range of motion. Tenderness present.      Instability Tests: Anterior drawer test negative. Posterior drawer test negative.      Comments: Tenderness to the lateral aspect of the left patella. No swelling, bruising, rash, erythema, or  deformity. Negative anterior/posterior drawer. Normal ROM, strength, and sensation      Neurological: She is alert and oriented to person, place, and time. Gait normal. GCS eye subscore is 4. GCS verbal subscore is 5. GCS motor subscore is 6.   Skin: Skin is warm, dry and intact. No rash noted.   Psychiatric: She has a normal mood and affect. Her speech is normal and behavior is normal. Judgment and thought content normal.         ED Course   Orthopedic Injury    Date/Time: 8/12/2022 1:17 PM  Performed by: ANKITA Mcgrath  Authorized by: Heath Corona MD     Location procedure was performed:  [facility] EMERGENCY DEPARTMENT  Injury:     Injury location:  Knee    Location details:  Left knee    Injury type:  Soft tissue      Pre-procedure assessment:     Neurovascular status: Neurovascularly intact      Distal perfusion: normal      Neurological function: normal      Range of motion: normal        Selections made in this section will also lock the Injury type section above.:     Supplies used:  Elastic bandage    Complications: No      Estimated blood loss (mL):  0    Specimens: No      Implants: No    Post-procedure assessment:     Neurovascular status: Neurovascularly intact      Distal perfusion: normal      Neurological function: normal      Range of motion: normal      Patient tolerance:  Patient tolerated the procedure well with no immediate complications      Labs Reviewed   POCT URINE PREGNANCY          Imaging Results          X-Ray Knee 3 View Left (Final result)  Result time 08/12/22 12:20:37    Final result by Italo Morales MD (08/12/22 12:20:37)                 Impression:      No acute displaced fracture-dislocation identified.      Electronically signed by: Italo Morales MD  Date:    08/12/2022  Time:    12:20             Narrative:    EXAMINATION:  XR KNEE 3 VIEW LEFT    CLINICAL HISTORY:  Injury, unspecified, initial encounter    TECHNIQUE:  AP, lateral, and Merchant views of the left knee were  performed.    COMPARISON:  Left knee series 05/18/2020    FINDINGS:  Bones are well mineralized. Overall alignment is within normal limits. No displaced fracture, dislocation or destructive osseous process.  No large suprapatellar joint effusion.  Minimal DJD, progressed from 05/18/2020 study.  No subcutaneous emphysema or radiodense retained foreign body.                                 Medications - No data to display  Medical Decision Making:   History:   Old Medical Records: I decided to obtain old medical records.  Independently Interpreted Test(s):   I have ordered and independently interpreted X-rays - see prior notes.  Clinical Tests:   Lab Tests: Ordered and Reviewed  The following lab test(s) were unremarkable: UPT  Radiological Study: Ordered and Reviewed       APC / Resident Notes:   This is an evaluation of a 26 y.o. female that presents to the Emergency Department for left knee injury    Physical Exam shows a non-toxic, afebrile, and well appearing female. Tenderness to the lateral aspect of the left patella. No swelling, bruising, rash, erythema, or deformity. Negative anterior/posterior drawer. Normal ROM, strength, and sensation    Vital signs are reassuring. If available, previous records reviewed.   RESULTS: UPT negative; Xray negative     My overall impression is Left knee strain. I considered, but at this time, do not suspect fracture, dislocation, cellulitis, septic joint.    ED Course: Xray, Ice, Ace, UPT (meds offered, patient refused). Discharge Meds/Instructions: tylenol, Ibuprofen, Robaxin. The diagnosis, treatment plan, instructions for follow-up as well as ED return precautions were discussed. All questions have been answered.          Scribe Attestation:   Scribe #1: I performed the above scribed service and the documentation accurately describes the services I performed. I attest to the accuracy of the note.               Scribe attestation: I, ELIJAH Chan, personally performed  the services described in this documentation.  All medical record entries made by the scribe were at my direction and in my presence.  I have reviewed the chart and agree that the record reflects my personal performance and is accurate and complete.    Clinical Impression:   Final diagnoses:  [T14.90XA] Injury  [S86.912A] Knee strain, left, initial encounter (Primary)          ED Disposition Condition    Discharge Stable        ED Prescriptions     Medication Sig Dispense Start Date End Date Auth. Provider    acetaminophen (TYLENOL) 650 MG TbSR Take 1 tablet (650 mg total) by mouth every 8 (eight) hours. for 5 days 20 tablet 8/12/2022 8/17/2022 ANKITA Mcgrath    ibuprofen (ADVIL,MOTRIN) 600 MG tablet Take 1 tablet (600 mg total) by mouth every 6 (six) hours as needed for Pain. 20 tablet 8/12/2022 8/17/2022 ANKITA Mcgrath    methocarbamoL (ROBAXIN) 500 MG Tab Take 2 tablets (1,000 mg total) by mouth 3 (three) times daily. for 5 days 30 tablet 8/12/2022 8/17/2022 ANKITA Mcgrath        Follow-up Information     Follow up With Specialties Details Why Contact Info    Jean Pierre Sanchez MD Orthopedic Surgery Schedule an appointment as soon as possible for a visit in 2 days  [address] [facility] [address]  [facility] [address] [phone]      Anton Rondon MD Family Medicine Schedule an appointment as soon as possible for a visit in 2 days  [address] [facility] [id number] [phone]      [facility] ED Emergency Medicine Go to  If symptoms worsen [address] [facility] [address] [phone]           ANKITA Mcgrath  08/12/22 4623

## 2022-08-12 NOTE — Clinical Note
"Milana Rinaldi" Ramon was seen and treated in our emergency department on 8/12/2022.  She may return to work on 08/15/2022.       If you have any questions or concerns, please don't hesitate to call.      ANKITA Mcgrath"

## 2023-01-27 ENCOUNTER — OFFICE VISIT (OUTPATIENT)
Dept: FAMILY MEDICINE | Facility: CLINIC | Age: 28
End: 2023-01-27
Payer: COMMERCIAL

## 2023-01-27 VITALS
RESPIRATION RATE: 18 BRPM | BODY MASS INDEX: 48.02 KG/M2 | WEIGHT: 271 LBS | HEART RATE: 99 BPM | OXYGEN SATURATION: 99 % | HEIGHT: 63 IN | SYSTOLIC BLOOD PRESSURE: 138 MMHG | DIASTOLIC BLOOD PRESSURE: 74 MMHG

## 2023-01-27 DIAGNOSIS — Z76.89 ENCOUNTER TO ESTABLISH CARE: Primary | ICD-10-CM

## 2023-01-27 DIAGNOSIS — R73.9 HYPERGLYCEMIA: ICD-10-CM

## 2023-01-27 DIAGNOSIS — E28.2 PCOS (POLYCYSTIC OVARIAN SYNDROME): ICD-10-CM

## 2023-01-27 PROBLEM — J06.9 ACUTE URI: Status: RESOLVED | Noted: 2020-02-20 | Resolved: 2023-01-27

## 2023-01-27 PROBLEM — E66.813 CLASS 3 SEVERE OBESITY DUE TO EXCESS CALORIES WITHOUT SERIOUS COMORBIDITY WITH BODY MASS INDEX (BMI) OF 45.0 TO 49.9 IN ADULT: Status: ACTIVE | Noted: 2017-02-09

## 2023-01-27 PROCEDURE — 1159F MED LIST DOCD IN RCRD: CPT | Mod: CPTII,S$GLB,, | Performed by: FAMILY MEDICINE

## 2023-01-27 PROCEDURE — 3008F PR BODY MASS INDEX (BMI) DOCUMENTED: ICD-10-PCS | Mod: CPTII,S$GLB,, | Performed by: FAMILY MEDICINE

## 2023-01-27 PROCEDURE — 99204 OFFICE O/P NEW MOD 45 MIN: CPT | Mod: S$GLB,,, | Performed by: FAMILY MEDICINE

## 2023-01-27 PROCEDURE — 3008F BODY MASS INDEX DOCD: CPT | Mod: CPTII,S$GLB,, | Performed by: FAMILY MEDICINE

## 2023-01-27 PROCEDURE — 3078F DIAST BP <80 MM HG: CPT | Mod: CPTII,S$GLB,, | Performed by: FAMILY MEDICINE

## 2023-01-27 PROCEDURE — 99204 PR OFFICE/OUTPT VISIT, NEW, LEVL IV, 45-59 MIN: ICD-10-PCS | Mod: S$GLB,,, | Performed by: FAMILY MEDICINE

## 2023-01-27 PROCEDURE — 1159F PR MEDICATION LIST DOCUMENTED IN MEDICAL RECORD: ICD-10-PCS | Mod: CPTII,S$GLB,, | Performed by: FAMILY MEDICINE

## 2023-01-27 PROCEDURE — 3078F PR MOST RECENT DIASTOLIC BLOOD PRESSURE < 80 MM HG: ICD-10-PCS | Mod: CPTII,S$GLB,, | Performed by: FAMILY MEDICINE

## 2023-01-27 PROCEDURE — 3075F PR MOST RECENT SYSTOLIC BLOOD PRESS GE 130-139MM HG: ICD-10-PCS | Mod: CPTII,S$GLB,, | Performed by: FAMILY MEDICINE

## 2023-01-27 PROCEDURE — 3075F SYST BP GE 130 - 139MM HG: CPT | Mod: CPTII,S$GLB,, | Performed by: FAMILY MEDICINE

## 2023-01-27 RX ORDER — METFORMIN HYDROCHLORIDE 500 MG/1
TABLET ORAL
Qty: 90 TABLET | Refills: 1 | Status: SHIPPED | OUTPATIENT
Start: 2023-01-27 | End: 2023-07-24

## 2023-01-27 NOTE — PROGRESS NOTES
"  SUBJECTIVE:    Patient ID: Milana Navarro is a 27 y.o. female.    Chief Complaint: Establish Care    HPI  Milana Navarro is a 27 y.o. F who comes in to establish care with this PCP.     Acute issues:   -Glucose was high on labs done through previous physician. Since then, has lost weight ("2 pant sizes"), cutting back sweets, soft drinks. Has been trying to get pregnant for 2 years.  Brings in labs from 6 months ago:   A1c was 7.2%  Had normal CBC, CMP, TSH. Prolactin mildly elevated but repeat wnl.  Pap w NILM    Care Team:  -OB/Gyn - previously Dr Mobley; will soon be establishing with Dr Moreno    MedHx:  - PCOS  - Obesity  -*last BP recorded 140/81, today 138/74      SurgHx:  - knee surgery  - elbow surgery  - wisdom tooth extraction    SocHx:  Lives with boyfriend  Works as Realtor and Patient Information at [employer]  EtOH - seldom  Tobacco - never  Recreational drugs - never  Sexually active - yes (no BC)    FamHx:  DM- maternal uncle, mgm  HTN - Dad   HLD - none known  Early CVD - none known   CA - mgf (colon, dx'd in his 60s)    Health Maintenance:  *Cervical CA screen - utd with pap Dr Katheryn Mobley 10/25/22 NILM (will upload to portal)  *Immz due - flu (declines), TDaP UTD per pt    Review of patient's allergies indicates:   Allergen Reactions    Bactrim [sulfamethoxazole-trimethoprim]     Corticosteroids (glucocorticoids) Dermatitis         Current Outpatient Medications:     medroxyPROGESTERone (PROVERA) 10 MG tablet, Take by mouth., Disp: , Rfl:     metFORMIN (GLUCOPHAGE) 500 MG tablet, Take 1 tab PO with a meal., Disp: 90 tablet, Rfl: 1    Review of Systems   Constitutional:  Negative for activity change and unexpected weight change.   HENT:  Negative for hearing loss, rhinorrhea and trouble swallowing.    Eyes:  Negative for discharge and visual disturbance.   Respiratory:  Negative for chest tightness and wheezing.    Cardiovascular:  Negative for chest pain and palpitations.   Gastrointestinal:  Negative " "for blood in stool, constipation, diarrhea and vomiting.   Endocrine: Negative for polydipsia and polyuria.   Genitourinary:  Negative for difficulty urinating, dysuria, hematuria and menstrual problem.   Musculoskeletal:  Negative for arthralgias, joint swelling and neck pain.   Neurological:  Negative for weakness and headaches.   Psychiatric/Behavioral:  Negative for confusion and dysphoric mood.         Objective:      Vitals:    01/27/23 0854 01/27/23 0855   BP: 138/74    Pulse: 99    Resp: 18    SpO2: 99%    Weight:  122.9 kg (271 lb)   Height: 5' 3" (1.6 m) 5' 3" (1.6 m)     Physical Exam  Vitals reviewed.   Constitutional:       General: She is not in acute distress.     Appearance: She is obese. She is not ill-appearing.   Cardiovascular:      Rate and Rhythm: Normal rate and regular rhythm.      Pulses: Normal pulses.      Heart sounds: Normal heart sounds.   Pulmonary:      Effort: Pulmonary effort is normal.      Breath sounds: Normal breath sounds.   Abdominal:      General: There is no distension.      Palpations: Abdomen is soft.      Tenderness: There is no abdominal tenderness. There is no guarding.   Musculoskeletal:      Cervical back: Neck supple.   Lymphadenopathy:      Cervical: No cervical adenopathy.   Skin:     General: Skin is warm and dry.   Neurological:      General: No focal deficit present.      Mental Status: She is alert and oriented to person, place, and time.   Psychiatric:         Mood and Affect: Mood normal.         Behavior: Behavior normal.           Assessment:       1. Encounter to establish care    2. PCOS (polycystic ovarian syndrome)    3. Hyperglycemia         Plan:       Encounter to establish care  -     Hemoglobin A1C; Future; Expected date: 01/27/2023  -     Lipid Panel; Future; Expected date: 01/27/2023    PCOS (polycystic ovarian syndrome)  -     metFORMIN (GLUCOPHAGE) 500 MG tablet; Take 1 tab PO with a meal.  Dispense: 90 tablet; Refill: " 1    Hyperglycemia    -Obtained history and reviewed age-appropriate, history-driven recommendations for health maintenance including immunizations and cancer screenings. UTD w pap, which she will upload to portal. Declines flu shot. Labs as above.  -Will check labs including A1c. Given hyperglycemia in previous labs with a1c in diabetic range as well as PCOS, will start Metformin.  - Discussed increased risks of pregnancy at current BMI and with hyperglycemia; patient aware and wishes to proceed with attempt at conception. Will establish with OBGyn Dr Tiffanie langford.  - Monitor BP, in prehypertensive range.    No follow-ups on file.        1/28/2023 Jannette Otto M.D.

## 2023-01-28 PROBLEM — R73.9 HYPERGLYCEMIA: Status: ACTIVE | Noted: 2023-01-28

## 2023-01-30 ENCOUNTER — LAB VISIT (OUTPATIENT)
Dept: LAB | Facility: HOSPITAL | Age: 28
End: 2023-01-30
Attending: FAMILY MEDICINE
Payer: COMMERCIAL

## 2023-01-30 DIAGNOSIS — Z76.89 ENCOUNTER TO ESTABLISH CARE: ICD-10-CM

## 2023-01-30 LAB
CHOLEST SERPL-MCNC: 165 MG/DL (ref 120–199)
CHOLEST/HDLC SERPL: 3.4 {RATIO} (ref 2–5)
ESTIMATED AVG GLUCOSE: 134 MG/DL (ref 68–131)
HBA1C MFR BLD: 6.3 % (ref 4.5–6.2)
HDLC SERPL-MCNC: 49 MG/DL (ref 40–75)
HDLC SERPL: 29.7 % (ref 20–50)
LDLC SERPL CALC-MCNC: 97.4 MG/DL (ref 63–159)
NONHDLC SERPL-MCNC: 116 MG/DL
TRIGL SERPL-MCNC: 93 MG/DL (ref 30–150)

## 2023-01-30 PROCEDURE — 83036 HEMOGLOBIN GLYCOSYLATED A1C: CPT | Performed by: FAMILY MEDICINE

## 2023-01-30 PROCEDURE — 36415 COLL VENOUS BLD VENIPUNCTURE: CPT | Performed by: FAMILY MEDICINE

## 2023-01-30 PROCEDURE — 80061 LIPID PANEL: CPT | Performed by: FAMILY MEDICINE

## 2023-04-21 ENCOUNTER — OCCUPATIONAL HEALTH (OUTPATIENT)
Dept: URGENT CARE | Facility: CLINIC | Age: 28
End: 2023-04-21

## 2023-04-22 LAB
ALBUMIN SERPL-MCNC: 4.9 G/DL (ref 3.9–5)
ALBUMIN/GLOB SERPL: 1.8 {RATIO} (ref 1.2–2.2)
ALP SERPL-CCNC: 82 IU/L (ref 44–121)
ALT SERPL-CCNC: 22 IU/L (ref 0–32)
APPEARANCE UR: CLEAR
AST SERPL-CCNC: 15 IU/L (ref 0–40)
BACTERIA #/AREA URNS HPF: ABNORMAL /[HPF]
BASOPHILS # BLD AUTO: 0.1 X10E3/UL (ref 0–0.2)
BASOPHILS NFR BLD AUTO: 1 %
BILIRUB SERPL-MCNC: 0.5 MG/DL (ref 0–1.2)
BILIRUB UR QL STRIP: NEGATIVE
BUN SERPL-MCNC: 14 MG/DL (ref 6–20)
BUN/CREAT SERPL: 17 (ref 9–23)
CALCIUM SERPL-MCNC: 10.1 MG/DL (ref 8.7–10.2)
CASTS URNS QL MICRO: ABNORMAL /LPF
CHLORIDE SERPL-SCNC: 99 MMOL/L (ref 96–106)
CHOLEST SERPL-MCNC: 204 MG/DL (ref 100–199)
CO2 SERPL-SCNC: 22 MMOL/L (ref 20–29)
COLOR UR: YELLOW
CREAT SERPL-MCNC: 0.84 MG/DL (ref 0.57–1)
EOSINOPHIL # BLD AUTO: 0 X10E3/UL (ref 0–0.4)
EOSINOPHIL NFR BLD AUTO: 0 %
EPI CELLS #/AREA URNS HPF: >10 /HPF (ref 0–10)
ERYTHROCYTE [DISTWIDTH] IN BLOOD BY AUTOMATED COUNT: 13.1 % (ref 11.7–15.4)
EST. GFR  (NO RACE VARIABLE): 98 ML/MIN/1.73
GGT SERPL-CCNC: 22 IU/L (ref 0–60)
GLOBULIN SER CALC-MCNC: 2.7 G/DL (ref 1.5–4.5)
GLUCOSE SERPL-MCNC: 103 MG/DL (ref 70–99)
GLUCOSE UR QL STRIP: NEGATIVE
HCT VFR BLD AUTO: 43.5 % (ref 34–46.6)
HDLC SERPL-MCNC: 52 MG/DL
HGB BLD-MCNC: 14.5 G/DL (ref 11.1–15.9)
HGB UR QL STRIP: NEGATIVE
HIV 1+2 AB+HIV1 P24 AG SERPL QL IA: NON REACTIVE
IMM GRANULOCYTES # BLD AUTO: 0 X10E3/UL (ref 0–0.1)
IMM GRANULOCYTES NFR BLD AUTO: 0 %
KETONES UR QL STRIP: NEGATIVE
LDLC SERPL CALC-MCNC: 125 MG/DL (ref 0–99)
LEUKOCYTE ESTERASE UR QL STRIP: ABNORMAL
LYMPHOCYTES # BLD AUTO: 2.3 X10E3/UL (ref 0.7–3.1)
LYMPHOCYTES NFR BLD AUTO: 24 %
MCH RBC QN AUTO: 29.5 PG (ref 26.6–33)
MCHC RBC AUTO-ENTMCNC: 33.3 G/DL (ref 31.5–35.7)
MCV RBC AUTO: 88 FL (ref 79–97)
MICRO URNS: ABNORMAL
MONOCYTES # BLD AUTO: 0.5 X10E3/UL (ref 0.1–0.9)
MONOCYTES NFR BLD AUTO: 5 %
NEUTROPHILS # BLD AUTO: 6.9 X10E3/UL (ref 1.4–7)
NEUTROPHILS NFR BLD AUTO: 70 %
NITRITE UR QL STRIP: NEGATIVE
PH UR STRIP: 6 [PH] (ref 5–7.5)
PLATELET # BLD AUTO: 393 X10E3/UL (ref 150–450)
POTASSIUM SERPL-SCNC: 4.5 MMOL/L (ref 3.5–5.2)
PROT SERPL-MCNC: 7.6 G/DL (ref 6–8.5)
PROT UR QL STRIP: NEGATIVE
RBC # BLD AUTO: 4.92 X10E6/UL (ref 3.77–5.28)
RBC #/AREA URNS HPF: ABNORMAL /HPF (ref 0–2)
RPR SER QL: NON REACTIVE
SODIUM SERPL-SCNC: 139 MMOL/L (ref 134–144)
SP GR UR STRIP: 1.02 (ref 1–1.03)
TRIGL SERPL-MCNC: 155 MG/DL (ref 0–149)
UROBILINOGEN UR STRIP-MCNC: 0.2 MG/DL (ref 0.2–1)
VLDLC SERPL CALC-MCNC: 27 MG/DL (ref 5–40)
WBC # BLD AUTO: 9.8 X10E3/UL (ref 3.4–10.8)
WBC #/AREA URNS HPF: ABNORMAL /HPF (ref 0–5)

## 2023-04-24 ENCOUNTER — OCCUPATIONAL HEALTH (OUTPATIENT)
Dept: URGENT CARE | Facility: CLINIC | Age: 28
End: 2023-04-24

## 2023-04-24 DIAGNOSIS — Z13.9 ENCOUNTER FOR SCREENING: Primary | ICD-10-CM

## 2023-04-24 PROCEDURE — 86580 PR  TB INTRADERMAL TEST: ICD-10-PCS | Mod: S$GLB,,, | Performed by: EMERGENCY MEDICINE

## 2023-04-24 PROCEDURE — 86580 TB INTRADERMAL TEST: CPT | Mod: S$GLB,,, | Performed by: EMERGENCY MEDICINE

## 2023-04-27 ENCOUNTER — OFFICE VISIT (OUTPATIENT)
Dept: FAMILY MEDICINE | Facility: CLINIC | Age: 28
End: 2023-04-27
Payer: COMMERCIAL

## 2023-04-27 VITALS
BODY MASS INDEX: 46.78 KG/M2 | HEIGHT: 63 IN | SYSTOLIC BLOOD PRESSURE: 122 MMHG | DIASTOLIC BLOOD PRESSURE: 70 MMHG | WEIGHT: 264 LBS | OXYGEN SATURATION: 98 % | HEART RATE: 90 BPM | RESPIRATION RATE: 18 BRPM

## 2023-04-27 DIAGNOSIS — E66.01 CLASS 3 SEVERE OBESITY DUE TO EXCESS CALORIES WITHOUT SERIOUS COMORBIDITY WITH BODY MASS INDEX (BMI) OF 45.0 TO 49.9 IN ADULT: ICD-10-CM

## 2023-04-27 DIAGNOSIS — E11.9 TYPE 2 DIABETES MELLITUS WITHOUT COMPLICATION, WITHOUT LONG-TERM CURRENT USE OF INSULIN: Primary | ICD-10-CM

## 2023-04-27 DIAGNOSIS — R73.9 HYPERGLYCEMIA: ICD-10-CM

## 2023-04-27 DIAGNOSIS — E28.2 PCOS (POLYCYSTIC OVARIAN SYNDROME): ICD-10-CM

## 2023-04-27 LAB — HBA1C MFR BLD: 6.6 %

## 2023-04-27 PROCEDURE — 83036 HEMOGLOBIN GLYCOSYLATED A1C: CPT | Mod: QW,S$GLB,, | Performed by: FAMILY MEDICINE

## 2023-04-27 PROCEDURE — 3074F SYST BP LT 130 MM HG: CPT | Mod: CPTII,S$GLB,, | Performed by: FAMILY MEDICINE

## 2023-04-27 PROCEDURE — 3008F PR BODY MASS INDEX (BMI) DOCUMENTED: ICD-10-PCS | Mod: CPTII,S$GLB,, | Performed by: FAMILY MEDICINE

## 2023-04-27 PROCEDURE — 3078F PR MOST RECENT DIASTOLIC BLOOD PRESSURE < 80 MM HG: ICD-10-PCS | Mod: CPTII,S$GLB,, | Performed by: FAMILY MEDICINE

## 2023-04-27 PROCEDURE — 3074F PR MOST RECENT SYSTOLIC BLOOD PRESSURE < 130 MM HG: ICD-10-PCS | Mod: CPTII,S$GLB,, | Performed by: FAMILY MEDICINE

## 2023-04-27 PROCEDURE — 3008F BODY MASS INDEX DOCD: CPT | Mod: CPTII,S$GLB,, | Performed by: FAMILY MEDICINE

## 2023-04-27 PROCEDURE — 83036 POCT HEMOGLOBIN A1C: ICD-10-PCS | Mod: QW,S$GLB,, | Performed by: FAMILY MEDICINE

## 2023-04-27 PROCEDURE — 1159F MED LIST DOCD IN RCRD: CPT | Mod: CPTII,S$GLB,, | Performed by: FAMILY MEDICINE

## 2023-04-27 PROCEDURE — 3044F HG A1C LEVEL LT 7.0%: CPT | Mod: CPTII,S$GLB,, | Performed by: FAMILY MEDICINE

## 2023-04-27 PROCEDURE — 3044F PR MOST RECENT HEMOGLOBIN A1C LEVEL <7.0%: ICD-10-PCS | Mod: CPTII,S$GLB,, | Performed by: FAMILY MEDICINE

## 2023-04-27 PROCEDURE — 3078F DIAST BP <80 MM HG: CPT | Mod: CPTII,S$GLB,, | Performed by: FAMILY MEDICINE

## 2023-04-27 PROCEDURE — 1159F PR MEDICATION LIST DOCUMENTED IN MEDICAL RECORD: ICD-10-PCS | Mod: CPTII,S$GLB,, | Performed by: FAMILY MEDICINE

## 2023-04-27 PROCEDURE — 99213 PR OFFICE/OUTPT VISIT, EST, LEVL III, 20-29 MIN: ICD-10-PCS | Mod: S$GLB,,, | Performed by: FAMILY MEDICINE

## 2023-04-27 PROCEDURE — 99213 OFFICE O/P EST LOW 20 MIN: CPT | Mod: S$GLB,,, | Performed by: FAMILY MEDICINE

## 2023-04-27 RX ORDER — SEMAGLUTIDE 0.68 MG/ML
INJECTION, SOLUTION SUBCUTANEOUS
Qty: 9 ML | Refills: 3 | Status: SHIPPED | OUTPATIENT
Start: 2023-04-27 | End: 2024-03-01

## 2023-04-27 NOTE — PROGRESS NOTES
SUBJECTIVE:    Patient ID: Milana Navarro is a 27 y.o. female.    Chief Complaint: Follow-up, Hyperglycemia, and PCOS    HPI  Milana Navarro is a 27 y.o. female who established care with me 3 months ago. She has a hx of Hyperglycemia, PCOS, Decreased fertility, and Prehypertension.     At last OV, review of labs showed A1c in diabetic range at 7.2% six months prior, but pt reported significant weight loss and repeat was 6.3%. She was started on Metformin. Today at POC 6.6%. Since last OV, has changed diet, losing weight. Going to gym. Has gone from size 3XL to XL (objectively 271 lb > 264 lb since last OV)    She denies personal history of pancreatitis. No personal nor family hx MTC or MEN2.      Office Visit on 04/27/2023   Component Date Value Ref Range Status    Hemoglobin A1C, POC 04/27/2023 6.6  % Final   Orders Only on 04/21/2023   Component Date Value Ref Range Status    WBC 04/21/2023 9.8  3.4 - 10.8 x10E3/uL Final    RBC 04/21/2023 4.92  3.77 - 5.28 x10E6/uL Final    Hemoglobin 04/21/2023 14.5  11.1 - 15.9 g/dL Final    Hematocrit 04/21/2023 43.5  34.0 - 46.6 % Final    MCV 04/21/2023 88  79 - 97 fL Final    MCH 04/21/2023 29.5  26.6 - 33.0 pg Final    MCHC 04/21/2023 33.3  31.5 - 35.7 g/dL Final    RDW 04/21/2023 13.1  11.7 - 15.4 % Final    Platelets 04/21/2023 393  150 - 450 x10E3/uL Final    Neutrophils 04/21/2023 70  Not Estab. % Final    Lymphs 04/21/2023 24  Not Estab. % Final    Monocytes 04/21/2023 5  Not Estab. % Final    Eos 04/21/2023 0  Not Estab. % Final    Basos 04/21/2023 1  Not Estab. % Final    Neutrophils (Absolute) 04/21/2023 6.9  1.4 - 7.0 x10E3/uL Final    Lymphs (Absolute) 04/21/2023 2.3  0.7 - 3.1 x10E3/uL Final    Monocytes(Absolute) 04/21/2023 0.5  0.1 - 0.9 x10E3/uL Final    Eos (Absolute) 04/21/2023 0.0  0.0 - 0.4 x10E3/uL Final    Baso (Absolute) 04/21/2023 0.1  0.0 - 0.2 x10E3/uL Final    Immature Granulocytes 04/21/2023 0  Not Estab. % Final    Immature Grans (Abs)  04/21/2023 0.0  0.0 - 0.1 x10E3/uL Final    Glucose 04/21/2023 103 (H)  70 - 99 mg/dL Final    BUN 04/21/2023 14  6 - 20 mg/dL Final    Creatinine 04/21/2023 0.84  0.57 - 1.00 mg/dL Final    eGFR 04/21/2023 98  >59 mL/min/1.73 Final    BUN/Creatinine Ratio 04/21/2023 17  9 - 23 Final    Sodium 04/21/2023 139  134 - 144 mmol/L Final    Potassium 04/21/2023 4.5  3.5 - 5.2 mmol/L Final    Chloride 04/21/2023 99  96 - 106 mmol/L Final    CO2 04/21/2023 22  20 - 29 mmol/L Final    Calcium 04/21/2023 10.1  8.7 - 10.2 mg/dL Final    Protein, Total 04/21/2023 7.6  6.0 - 8.5 g/dL Final    Albumin 04/21/2023 4.9  3.9 - 5.0 g/dL Final    Globulin, Total 04/21/2023 2.7  1.5 - 4.5 g/dL Final    Albumin/Globulin Ratio 04/21/2023 1.8  1.2 - 2.2 Final    Total Bilirubin 04/21/2023 0.5  0.0 - 1.2 mg/dL Final    Alkaline Phosphatase 04/21/2023 82  44 - 121 IU/L Final    AST 04/21/2023 15  0 - 40 IU/L Final    ALT 04/21/2023 22  0 - 32 IU/L Final    Specific Gravity, UA 04/21/2023 1.016  1.005 - 1.030 Final    pH, UA 04/21/2023 6.0  5.0 - 7.5 Final    Color, UA 04/21/2023 Yellow  Yellow Final    Clarity, UA 04/21/2023 Clear  Clear Final    Leukocytes, UA 04/21/2023 2+ (A)  Negative Final    Protein, UA 04/21/2023 Negative  Negative/Trace Final    Glucose, UA 04/21/2023 Negative  Negative Final    Ketones, UA 04/21/2023 Negative  Negative Final    Occult Blood UA 04/21/2023 Negative  Negative Final    Bilirubin, UA 04/21/2023 Negative  Negative Final    Urobilinogen, UA 04/21/2023 0.2  0.2 - 1.0 mg/dL Final    Nitrite, UA 04/21/2023 Negative  Negative Final    Microscopic Examination 04/21/2023 See below:   Final    WBC, UA 04/21/2023 11-30 (A)  0 - 5 /hpf Final    RBC, UA 04/21/2023 0-2  0 - 2 /hpf Final    Epithelial Cells (non renal) 04/21/2023 >10 (A)  0 - 10 /hpf Final    Casts 04/21/2023 None seen  None seen /lpf Final    Bacteria, UA 04/21/2023 Few  None seen/Few Final    Cholesterol 04/21/2023 204 (H)  100 - 199 mg/dL Final     Triglycerides 04/21/2023 155 (H)  0 - 149 mg/dL Final    HDL 04/21/2023 52  >39 mg/dL Final    VLDL Cholesterol Gabriele 04/21/2023 27  5 - 40 mg/dL Final    LDL Calculated 04/21/2023 125 (H)  0 - 99 mg/dL Final    HIV Screen 4th Generation wRfx 04/21/2023 Non Reactive  Non Reactive Final    RPR 04/21/2023 Non Reactive  Non Reactive Final    GGT 04/21/2023 22  0 - 60 IU/L Final   Lab Visit on 01/30/2023   Component Date Value Ref Range Status    Hemoglobin A1C 01/30/2023 6.3 (H)  4.5 - 6.2 % Final    Estimated Avg Glucose 01/30/2023 134 (H)  68 - 131 mg/dL Final    Cholesterol 01/30/2023 165  120 - 199 mg/dL Final    Triglycerides 01/30/2023 93  30 - 150 mg/dL Final    HDL 01/30/2023 49  40 - 75 mg/dL Final    LDL Cholesterol 01/30/2023 97.4  63.0 - 159.0 mg/dL Final    HDL/Cholesterol Ratio 01/30/2023 29.7  20.0 - 50.0 % Final    Total Cholesterol/HDL Ratio 01/30/2023 3.4  2.0 - 5.0 Final    Non-HDL Cholesterol 01/30/2023 116  mg/dL Final       Past Medical History:   Diagnosis Date    Murmur     PCOS (polycystic ovarian syndrome)      Past Surgical History:   Procedure Laterality Date    ELBOW SURGERY      KNEE SURGERY      WISDOM TOOTH EXTRACTION       Family History   Problem Relation Age of Onset    Colon cancer Maternal Grandfather     Breast cancer Neg Hx     Ovarian cancer Neg Hx        Tobacco History:  reports that she has never smoked. She has never been exposed to tobacco smoke. She has never used smokeless tobacco.  Alcohol History:  reports no history of alcohol use.  Drug History:  reports no history of drug use.    Review of patient's allergies indicates:   Allergen Reactions    Bactrim [sulfamethoxazole-trimethoprim]     Corticosteroids (glucocorticoids) Dermatitis       Current Outpatient Medications:     metFORMIN (GLUCOPHAGE) 500 MG tablet, Take 1 tab PO with a meal., Disp: 90 tablet, Rfl: 1    semaglutide (OZEMPIC) 0.25 mg or 0.5 mg (2 mg/3 mL) pen injector, Inject 0.25mg subQ q7d x 4 weeks, then  "increase to 0.5mg q7d., Disp: 9 mL, Rfl: 3    Review of Systems   Constitutional:  Negative for activity change and unexpected weight change.   HENT:  Negative for hearing loss, rhinorrhea and trouble swallowing.    Eyes:  Negative for discharge and visual disturbance.   Respiratory:  Negative for chest tightness and wheezing.    Cardiovascular:  Negative for chest pain and palpitations.   Gastrointestinal:  Negative for blood in stool, constipation, diarrhea and vomiting.   Endocrine: Negative for polydipsia and polyuria.   Genitourinary:  Negative for difficulty urinating, dysuria, hematuria and menstrual problem.   Musculoskeletal:  Negative for arthralgias, joint swelling and neck pain.   Neurological:  Negative for headaches.   Psychiatric/Behavioral:  Negative for confusion and dysphoric mood.    As in HPI           Objective:      Vitals:    04/27/23 0817   BP: 122/70   Pulse: 90   Resp: 18   SpO2: 98%   Weight: 119.7 kg (264 lb)   Height: 5' 3" (1.6 m)     Physical Exam  Vitals reviewed.   Constitutional:       General: She is not in acute distress.     Appearance: She is obese. She is not ill-appearing.   Cardiovascular:      Rate and Rhythm: Normal rate and regular rhythm.      Pulses: Normal pulses.      Heart sounds: Normal heart sounds.   Pulmonary:      Effort: Pulmonary effort is normal.      Breath sounds: Normal breath sounds.   Skin:     General: Skin is warm and dry.   Neurological:      Mental Status: She is alert and oriented to person, place, and time.   Psychiatric:         Mood and Affect: Mood normal.         Behavior: Behavior normal.            Assessment:       1. Type 2 diabetes mellitus without complication, without long-term current use of insulin    2. Hyperglycemia    3. PCOS (polycystic ovarian syndrome)    4. Class 3 severe obesity due to excess calories without serious comorbidity with body mass index (BMI) of 45.0 to 49.9 in adult             Plan:       Type 2 diabetes mellitus " without complication, without long-term current use of insulin  -     semaglutide (OZEMPIC) 0.25 mg or 0.5 mg (2 mg/3 mL) pen injector; Inject 0.25mg subQ q7d x 4 weeks, then increase to 0.5mg q7d.  Dispense: 9 mL; Refill: 3    Hyperglycemia  -     Hemoglobin A1C, POCT    PCOS (polycystic ovarian syndrome)    Class 3 severe obesity due to excess calories without serious comorbidity with body mass index (BMI) of 45.0 to 49.9 in adult    -A1c remains above goal despite addition of Metformin and pt's ongoing lifestyle modifications including dietary changes and exercise. Will add Ozempic; common AE profile reviewed. Encouraged to continue practicing healthful behaviors. Diabetic Meal Planning handout provided.  -Continue Metformin for insulin resistance including PCOS.  -Anticipate continued weight loss based on changes above and addition of Semaglutide.  - Next OV: foot exam, refer for eye exam    Follow up in about 3 months (around 7/27/2023) for Diabetes, PCOS.        4/27/2023 Jannette Otto M.D.

## 2023-07-23 DIAGNOSIS — E28.2 PCOS (POLYCYSTIC OVARIAN SYNDROME): ICD-10-CM

## 2023-07-24 RX ORDER — METFORMIN HYDROCHLORIDE 500 MG/1
TABLET ORAL
Qty: 90 TABLET | Refills: 1 | Status: SHIPPED | OUTPATIENT
Start: 2023-07-24 | End: 2024-02-14

## 2023-09-12 ENCOUNTER — TELEPHONE (OUTPATIENT)
Dept: FAMILY MEDICINE | Facility: CLINIC | Age: 28
End: 2023-09-12

## 2023-09-14 NOTE — PROGRESS NOTES
"Subjective:       Patient ID: Milana Navarro is a 22 y.o. female.    Vitals:  height is 5' 5" (1.651 m) and weight is 99.8 kg (220 lb). Her oral temperature is 98.3 °F (36.8 °C). Her blood pressure is 145/97 (abnormal) and her pulse is 90. Her oxygen saturation is 99%.     Chief Complaint: Sore Throat    Sore Throat    This is a new problem. The current episode started 1 to 4 weeks ago. The problem has been unchanged. Neither side of throat is experiencing more pain than the other. There has been no fever. Associated symptoms include congestion and coughing. Pertinent negatives include no abdominal pain, ear pain, headaches, hoarse voice or shortness of breath.   Sinusitis   This is a new problem. The current episode started 1 to 4 weeks ago. The problem is unchanged. There has been no fever. Associated symptoms include chills, congestion, coughing, sinus pressure and a sore throat. Pertinent negatives include no ear pain, headaches, hoarse voice or shortness of breath.     Review of Systems   Constitution: Positive for chills. Negative for fever and malaise/fatigue.   HENT: Positive for congestion, sinus pressure and sore throat. Negative for ear pain and hoarse voice.    Eyes: Negative for discharge and redness.   Cardiovascular: Negative for chest pain, dyspnea on exertion and leg swelling.   Respiratory: Positive for cough and sputum production. Negative for shortness of breath and wheezing.    Musculoskeletal: Negative for myalgias.   Gastrointestinal: Negative for abdominal pain and nausea.   Neurological: Negative for headaches.       Objective:      Physical Exam   Constitutional: She is oriented to person, place, and time. She appears well-developed and well-nourished.   HENT:   Head: Normocephalic and atraumatic.   Right Ear: External ear normal.   Left Ear: External ear normal.   Nose: Mucosal edema, rhinorrhea and sinus tenderness present. Right sinus exhibits maxillary sinus tenderness. Left sinus " With complex medical care, non compliance and difficult living situation. Does live with sister but she reports that it is hard at times.  Outpatient PCP gave referral to medical care center for further help  · Will consult CM while here  · Will need appropriate medicine reconciliation come discharge as patient reports "running out" of medications or "not taking" them exhibits maxillary sinus tenderness.   Mouth/Throat: Oropharyngeal exudate (post nasal drainage) and posterior oropharyngeal erythema present.   Eyes: Conjunctivae and EOM are normal. Pupils are equal, round, and reactive to light.   Neck: Normal range of motion.   Cardiovascular: Normal rate, regular rhythm and normal heart sounds.    Pulmonary/Chest: Effort normal and breath sounds normal. She has no wheezes. She has no rales.   Abdominal: Soft.   Musculoskeletal: Normal range of motion.   Neurological: She is alert and oriented to person, place, and time.   Skin: Skin is warm.       Assessment:       1. Acute pansinusitis, recurrence not specified        Plan:         Acute pansinusitis, recurrence not specified  -     cefTRIAXone injection 500 mg; Inject 0.5 g (500 mg total) into the muscle one time.  -     dexamethasone injection 7 mg; Inject 0.7 mLs (7 mg total) into the muscle once.  -     azithromycin (Z-VIRGIL) 250 MG tablet; Take 1 tablet (250 mg total) by mouth once daily. Double dose on day #1  Dispense: 6 tablet; Refill: 0  -     benzonatate (TESSALON) 200 MG capsule; Take 1 capsule (200 mg total) by mouth 3 (three) times daily as needed for Cough.  Dispense: 30 capsule; Refill: 0  -     guaifenesin-codeine 100-10 mg/5 ml (CHERATUSSIN AC)  mg/5 mL syrup; Take 10 mLs by mouth nightly as needed for Cough.  Dispense: 120 mL; Refill: 0

## 2023-11-15 LAB
PAP RECOMMENDATION EXT: NORMAL
PAP SMEAR: NORMAL

## 2024-01-22 ENCOUNTER — OFFICE VISIT (OUTPATIENT)
Dept: URGENT CARE | Facility: CLINIC | Age: 29
End: 2024-01-22
Payer: COMMERCIAL

## 2024-01-22 VITALS
HEART RATE: 87 BPM | OXYGEN SATURATION: 97 % | DIASTOLIC BLOOD PRESSURE: 91 MMHG | BODY MASS INDEX: 46.78 KG/M2 | TEMPERATURE: 99 F | SYSTOLIC BLOOD PRESSURE: 141 MMHG | RESPIRATION RATE: 16 BRPM | HEIGHT: 63 IN | WEIGHT: 264 LBS

## 2024-01-22 DIAGNOSIS — R05.9 COUGH, UNSPECIFIED TYPE: ICD-10-CM

## 2024-01-22 DIAGNOSIS — U07.1 COVID-19: Primary | ICD-10-CM

## 2024-01-22 LAB
CTP QC/QA: YES
SARS-COV-2 AG RESP QL IA.RAPID: POSITIVE

## 2024-01-22 PROCEDURE — 99214 OFFICE O/P EST MOD 30 MIN: CPT | Mod: S$GLB,,, | Performed by: PHYSICIAN ASSISTANT

## 2024-01-22 PROCEDURE — 87811 SARS-COV-2 COVID19 W/OPTIC: CPT | Mod: QW,S$GLB,, | Performed by: PHYSICIAN ASSISTANT

## 2024-01-22 NOTE — PROGRESS NOTES
"Subjective:      Patient ID: Milana Navarro is a 28 y.o. female.    Vitals:  height is 5' 3" (1.6 m) and weight is 119.7 kg (264 lb). Her respiration is 16.     Chief Complaint: Cough    Sore throat, cough, congestion X 2 days.  Positive home covid test today.     Cough  This is a new problem. The current episode started in the past 7 days. Associated symptoms include nasal congestion and a sore throat.     HENT:  Positive for sore throat.    Respiratory:  Positive for cough.     Objective:     Physical Exam    Assessment:     No diagnosis found.    Plan:       There are no diagnoses linked to this encounter.                "

## 2024-01-22 NOTE — LETTER
January 22, 2024      [facility]  [address]  [facility] [address]  Phone: [phone]       Patient: Milana Navarro   YOB: 1995  Date of Visit: 01/22/2024    To Whom It May Concern:    Blessing Navarro  was at [facility] on 01/22/2024. The patient may return to work/school on 1/29/2024 with no restrictions. If you have any questions or concerns, or if I can be of further assistance, please do not hesitate to contact me.    Sincerely,    Mary Hair PA-C

## 2024-01-22 NOTE — PROGRESS NOTES
"Subjective:      Patient ID: Milana Navarro is a 28 y.o. female.    Vitals:  height is 5' 3" (1.6 m) and weight is 119.7 kg (264 lb). Her oral temperature is 99 °F (37.2 °C). Her blood pressure is 141/91 (abnormal) and her pulse is 87. Her respiration is 16 and oxygen saturation is 97%.     Chief Complaint: Cough    Patient is a 28 year old female who presents with cough that started today. She has PMH significant for obesity, DM-2 and PCOS. She reports associated sore throat and congestion. She is taking over the counter mediations as needed. She denied N/V. Took a home COVID test today which was positive.  She reports diarrhea.                 Constitution: Positive for chills and fever.   HENT:  Positive for congestion and sore throat. Negative for ear pain and ear discharge.    Respiratory:  Positive for cough.    Gastrointestinal:  Positive for diarrhea. Negative for abdominal pain, nausea and vomiting.      Objective:     Physical Exam   Constitutional: She appears well-developed.   HENT:   Head: Normocephalic and atraumatic.   Ears:   Right Ear: Tympanic membrane, external ear and ear canal normal.   Left Ear: Tympanic membrane, external ear and ear canal normal.   Nose: Nose normal.   Mouth/Throat: Uvula is midline and mucous membranes are normal. Mucous membranes are moist. No oropharyngeal exudate or posterior oropharyngeal erythema. Oropharynx is clear.   Eyes: Conjunctivae are normal. Right eye exhibits no discharge. Left eye exhibits no discharge.   Cardiovascular: Normal rate and normal heart sounds.   Pulmonary/Chest: Effort normal and breath sounds normal. No stridor. No respiratory distress. She has no decreased breath sounds.   Abdominal: Normal appearance.   Neurological: She is alert.   Nursing note and vitals reviewed.      Assessment:     1. COVID-19    2. Cough, unspecified type        Plan:       COVID-19  -     SARS Coronavirus 2 Antigen, POCT Manual Read    Cough, unspecified type  -     " SARS Coronavirus 2 Antigen, POCT Manual Read          Medical Decision Making:   History:   Old Medical Records: I decided to obtain old medical records.  Clinical Tests:   Lab Tests: Ordered and Reviewed  Urgent Care Management:  Urgent evaluation of a well appearing 28 year old female who presents with sore throat, fever and congestion. Vital signs are stable. Clear and equal breath sounds bilaterally. Oxygen saturation is stable. I doubt pneumonia. No sign of otitis media. Denied GI complaints. Patient is noted to be COVID positive. Symptomatic treatment at home. Discussed results with patient. Return precautions given. Based on my clinical evaluation, I do not appreciate any immediate, emergent, or life threatening condition or etiology that warrants additional workup today and feel that the patient can be discharged with close follow up care.  Patient is to follow up with their primary care provider. All questions answered.

## 2024-01-28 ENCOUNTER — OFFICE VISIT (OUTPATIENT)
Dept: URGENT CARE | Facility: CLINIC | Age: 29
End: 2024-01-28
Payer: COMMERCIAL

## 2024-01-28 ENCOUNTER — HOSPITAL ENCOUNTER (EMERGENCY)
Facility: HOSPITAL | Age: 29
Discharge: HOME | End: 2024-01-28
Attending: EMERGENCY MEDICINE
Payer: COMMERCIAL

## 2024-01-28 ENCOUNTER — TELEPHONE (OUTPATIENT)
Dept: URGENT CARE | Facility: CLINIC | Age: 29
End: 2024-01-28

## 2024-01-28 VITALS
TEMPERATURE: 98 F | SYSTOLIC BLOOD PRESSURE: 131 MMHG | HEIGHT: 63 IN | HEART RATE: 98 BPM | BODY MASS INDEX: 44.3 KG/M2 | RESPIRATION RATE: 20 BRPM | WEIGHT: 250 LBS | OXYGEN SATURATION: 98 % | DIASTOLIC BLOOD PRESSURE: 97 MMHG

## 2024-01-28 VITALS
WEIGHT: 255.38 LBS | RESPIRATION RATE: 18 BRPM | SYSTOLIC BLOOD PRESSURE: 145 MMHG | HEART RATE: 81 BPM | HEIGHT: 63 IN | BODY MASS INDEX: 45.25 KG/M2 | TEMPERATURE: 99 F | OXYGEN SATURATION: 98 % | DIASTOLIC BLOOD PRESSURE: 98 MMHG

## 2024-01-28 DIAGNOSIS — R06.09 COVID-19 LONG HAULER MANIFESTING CHRONIC DYSPNEA: ICD-10-CM

## 2024-01-28 DIAGNOSIS — U09.9 COVID-19 LONG HAULER MANIFESTING CHRONIC DYSPNEA: ICD-10-CM

## 2024-01-28 DIAGNOSIS — R05.9 COUGH, UNSPECIFIED TYPE: Primary | ICD-10-CM

## 2024-01-28 DIAGNOSIS — R07.89 CHEST TIGHTNESS: ICD-10-CM

## 2024-01-28 DIAGNOSIS — R06.02 SHORTNESS OF BREATH: ICD-10-CM

## 2024-01-28 DIAGNOSIS — U07.1 COVID-19: Primary | ICD-10-CM

## 2024-01-28 LAB
B-HCG UR QL: NEGATIVE
CTP QC/QA: YES

## 2024-01-28 PROCEDURE — 99214 OFFICE O/P EST MOD 30 MIN: CPT | Mod: S$GLB,,, | Performed by: NURSE PRACTITIONER

## 2024-01-28 PROCEDURE — 81025 URINE PREGNANCY TEST: CPT | Performed by: PHYSICIAN ASSISTANT

## 2024-01-28 PROCEDURE — 93005 ELECTROCARDIOGRAM TRACING: CPT

## 2024-01-28 PROCEDURE — 93010 ELECTROCARDIOGRAM REPORT: CPT | Mod: ,,, | Performed by: GENERAL PRACTICE

## 2024-01-28 PROCEDURE — 99284 EMERGENCY DEPT VISIT MOD MDM: CPT | Mod: 25

## 2024-01-28 RX ORDER — ALBUTEROL SULFATE 90 UG/1
2 AEROSOL, METERED RESPIRATORY (INHALATION) EVERY 6 HOURS PRN
Qty: 18 G | Refills: 0 | Status: SHIPPED | OUTPATIENT
Start: 2024-01-28 | End: 2024-03-01

## 2024-01-28 RX ORDER — BROMPHENIRAMINE MALEATE, PSEUDOEPHEDRINE HYDROCHLORIDE, AND DEXTROMETHORPHAN HYDROBROMIDE 2; 30; 10 MG/5ML; MG/5ML; MG/5ML
10 SYRUP ORAL EVERY 6 HOURS PRN
Qty: 118 ML | Refills: 0 | Status: SHIPPED | OUTPATIENT
Start: 2024-01-28 | End: 2024-03-01

## 2024-01-28 NOTE — ED PROVIDER NOTES
Encounter Date: 1/28/2024       History     Chief Complaint   Patient presents with    COVID-19 Concerns     Patient is covid positive and cough and went to urgent care and they advised she may need a chest x ray      28-year-old female history of PCOS presents to the ER with cough and shortness of breath.  Patient tested positive for COVID earlier this week.  She has had symptoms for about 6 days.  Went to urgent care and they referred her here for chest x-ray.  No fevers no chills.  Symptoms are improving.  She denies any chest pain or calf pain.  No history of PE or DVT.  Complains of anosmia.    The history is provided by the patient.     Review of patient's allergies indicates:   Allergen Reactions    Bactrim [sulfamethoxazole-trimethoprim]     Corticosteroids (glucocorticoids) Dermatitis     Past Medical History:   Diagnosis Date    Murmur     PCOS (polycystic ovarian syndrome)      Past Surgical History:   Procedure Laterality Date    ELBOW SURGERY      KNEE SURGERY      WISDOM TOOTH EXTRACTION       Family History   Problem Relation Age of Onset    Colon cancer Maternal Grandfather     Breast cancer Neg Hx     Ovarian cancer Neg Hx      Social History     Tobacco Use    Smoking status: Never     Passive exposure: Never    Smokeless tobacco: Never   Substance Use Topics    Alcohol use: No    Drug use: No     Review of Systems   Respiratory:  Positive for cough, shortness of breath and wheezing.    Cardiovascular: Negative.  Negative for chest pain and leg swelling.   Gastrointestinal: Negative.        Physical Exam     Initial Vitals [01/28/24 1507]   BP Pulse Resp Temp SpO2   (!) 131/97 98 20 98.1 °F (36.7 °C) 98 %      MAP       --         Physical Exam    Nursing note and vitals reviewed.  Constitutional: She appears well-developed and well-nourished.   HENT:   Head: Normocephalic and atraumatic.   Eyes: EOM are normal.   Neck: Neck supple.   Normal range of motion.  Cardiovascular:  Normal rate, regular  rhythm and normal heart sounds.     Exam reveals no gallop and no friction rub.       No murmur heard.  Pulmonary/Chest: Breath sounds normal. No respiratory distress. She has no wheezes. She has no rhonchi. She has no rales.   Musculoskeletal:         General: Normal range of motion.      Cervical back: Normal range of motion and neck supple.     Neurological: She is alert and oriented to person, place, and time.   Skin: Skin is warm and dry.   Psychiatric: She has a normal mood and affect. Her behavior is normal. Judgment and thought content normal.         ED Course   Procedures  Labs Reviewed   POCT URINE PREGNANCY          Imaging Results              X-Ray Chest PA And Lateral (Final result)  Result time 01/28/24 17:04:24      Final result by Nikita Parsons MD (01/28/24 17:04:24)                   Impression:      Negative chest.      Electronically signed by: Nikita Parsons MD  Date:    01/28/2024  Time:    17:04               Narrative:    EXAMINATION:  XR CHEST PA AND LATERAL    CLINICAL HISTORY:  covid;    TECHNIQUE:  PA and lateral views of the chest were performed.    COMPARISON:  01/28/2024    FINDINGS:  The cardiomediastinal silhouette is within normal limits.  The lungs are well expanded without consolidation or pleural effusion.                                       X-Ray Chest AP Portable (Final result)  Result time 01/28/24 15:26:44      Final result by Caitlyn Valle MD (01/28/24 15:26:44)                   Impression:      25 mm irregular nodular density projected over the left lower lung zone.  Consider additional evaluation with non-contrast chest CT      Electronically signed by: Rayray Valle MD  Date:    01/28/2024  Time:    15:26               Narrative:    EXAMINATION:  XR CHEST AP PORTABLE    CLINICAL HISTORY:  shortness of breath, covid+;    TECHNIQUE:  A single portable semi-upright AP chest radiograph was acquired.    COMPARISON:  None    FINDINGS:  The  cardiomediastinal silhouette is normal in appearance.  No pulmonary vascular congestion appreciated. There is a 25 mm irregular nodular density projected over the left lower lung zone on the frontal radiograph.  While this could certainly represent focal infectious airspace consolidation, a solitary pulmonary nodule cannot be excluded.  Additional characterization/evaluation with non-contrast CT of the chest should be considered for clarification.  No significant volume of pleural fluid or pneumothorax. The bones are unremarkable for age.                                       Medications - No data to display  Medical Decision Making  28-year-old female who tested positive for COVID earlier this week presents to the ER for abnormal chest x-ray earlier today.  One-view chest x-ray demonstrate a possible nodule versus infiltrate.  On a two-view chest there is no evidence of any abnormality.  Patient can be discharged home.  No indication for any further testing.    Amount and/or Complexity of Data Reviewed  External Data Reviewed: radiology and notes.  Labs:  Decision-making details documented in ED Course.  Radiology: ordered. Decision-making details documented in ED Course.               ED Course as of 01/28/24 1810   Sun Jan 28, 2024   1647 SpO2: 98 % [EF]   1647 Resp: 20 [EF]   1647 Pulse: 98 [EF]   1647 Temp Source: Oral [EF]   1647 Temp: 98.1 °F (36.7 °C) [EF]   1647 BP(!): 131/97 [EF]   1647 X-Ray Chest AP Portable [EF]   1655 Sinus rhythm incomplete right bundle branch block 80 beats per minute normal axis no ST elevation or depression or T-wave inversion independently interpreted [EF]   1702 Preg Test, Ur: Negative [EF]   1709 X-Ray Chest PA And Lateral [EF]      ED Course User Index  [EF] Jaret Gomez MD                           Clinical Impression:  Final diagnoses:  [R06.02] Shortness of breath  [U07.1] COVID-19 (Primary)          ED Disposition Condition    Discharge Stable          ED  Prescriptions    None       Follow-up Information       Follow up With Specialties Details Why Contact Info Additional Information    [facility] -  Emergency Medicine  As needed, If symptoms worsen [address] [place] [address] [address] [place] [address] 1st floor             Jaret Gomez MD  01/28/24 8344

## 2024-01-28 NOTE — PROGRESS NOTES
"Subjective:      Patient ID: Milana Navarro is a 28 y.o. female.    Vitals:  height is 5' 3" (1.6 m) and weight is 115.8 kg (255 lb 6.4 oz). Her oral temperature is 98.5 °F (36.9 °C). Her blood pressure is 145/98 (abnormal) and her pulse is 81. Her respiration is 18 and oxygen saturation is 98%.     Chief Complaint: Cough, Shortness of Breath, Nasal Congestion, and Fever    Pt states that her symptoms started on 7 days ago. Patient states that her symptoms are the following: sob, nasal congestion, cough. Patient states that she was dx with covid on Monday. Patient states that she will need an extention on her work excuse.      Constitution: Positive for fatigue. Negative for fever.   Cardiovascular:  Negative for chest pain.   Respiratory:  Positive for chest tightness, cough and shortness of breath.    Gastrointestinal:  Positive for diarrhea. Negative for abdominal pain.      Objective:     Physical Exam   HENT:   Head: Normocephalic.   Ears:   Right Ear: Tympanic membrane and ear canal normal.   Left Ear: Tympanic membrane and ear canal normal.   Mouth/Throat: Posterior oropharyngeal erythema present. No oropharyngeal exudate.   Eyes: Conjunctivae are normal.   Cardiovascular: Normal rate.   Pulmonary/Chest: Effort normal and breath sounds normal.   Abdominal: Normal appearance.   Musculoskeletal: Normal range of motion.         General: Normal range of motion.   Neurological: She is alert.   Skin: Skin is no rash.   Psychiatric: Her behavior is normal. Mood, judgment and thought content normal.   Nursing note and vitals reviewed.      Assessment:     1. Cough, unspecified type    2. Chest tightness    3. COVID-19 long hauler manifesting chronic dyspnea        Plan:       Cough, unspecified type  -     Cancel: X-Ray Chest PA And Lateral; Future; Expected date: 01/28/2024    Chest tightness  -     X-Ray Chest PA And Lateral; Future; Expected date: 01/28/2024    COVID-19 long hauler manifesting chronic " dyspnea    Other orders  -     brompheniramine-pseudoeph-DM (BROMFED DM) 2-30-10 mg/5 mL Syrp; Take 10 mLs by mouth every 6 (six) hours as needed (cough).  Dispense: 118 mL; Refill: 0  -     albuterol (VENTOLIN HFA) 90 mcg/actuation inhaler; Inhale 2 puffs into the lungs every 6 (six) hours as needed for Wheezing. Rescue  Dispense: 18 g; Refill: 0      Pt presents with continued chest tightness and cough with shortness of breath on exertion. She was advised to return by her work for re-evaluation of symptoms as she needs to be fully improved to perform job duties. On exam she is well and non-toxic appearing. Will obtain outside xray, order placed. She will be discharged on albuterol and further symptomatic cough medication. If xray shows concern for pneumonia will call Pt with results and treat. ER precautions given otherwise.

## 2024-01-28 NOTE — TELEPHONE ENCOUNTER
Called Pt regarding CXR and need for CT. States she will go back inside and check in to obtain further testing.

## 2024-01-28 NOTE — FIRST PROVIDER EVALUATION
Emergency Department TeleTriage Encounter Note      CHIEF COMPLAINT    Chief Complaint   Patient presents with    COVID-19 Concerns     Patient is covid positive and cough and went to urgent care and they advised she may need a chest x ray        VITAL SIGNS   Initial Vitals [01/28/24 1507]   BP Pulse Resp Temp SpO2   (!) 131/97 98 20 98.1 °F (36.7 °C) 98 %      MAP       --            ALLERGIES    Review of patient's allergies indicates:   Allergen Reactions    Bactrim [sulfamethoxazole-trimethoprim]     Corticosteroids (glucocorticoids) Dermatitis       PROVIDER TRIAGE NOTE  Patient with Covid x5 days. Now having some shortness of breath with exertion. No chest pain. She went to urgent care today and was advised to come to the ED for chest xray.       ORDERS  Labs Reviewed - No data to display    ED Orders (720h ago, onward)      None              Virtual Visit Note: The provider triage portion of this emergency department evaluation and documentation was performed via [employer], a HIPAA-compliant telemedicine application, in concert with a tele-presenter in the room. A face to face patient evaluation with one of my colleagues will occur once the patient is placed in an emergency department room.      DISCLAIMER: This note was prepared with [employer]ava voice recognition transcription software. Garbled syntax, mangled pronouns, and other bizarre constructions may be attributed to that software system.

## 2024-01-31 ENCOUNTER — CLINICAL SUPPORT (OUTPATIENT)
Dept: OTHER | Facility: CLINIC | Age: 29
End: 2024-01-31
Payer: COMMERCIAL

## 2024-01-31 DIAGNOSIS — Z00.8 ENCOUNTER FOR OTHER GENERAL EXAMINATION: ICD-10-CM

## 2024-01-31 PROCEDURE — 82947 ASSAY GLUCOSE BLOOD QUANT: CPT | Mod: QW,S$GLB,, | Performed by: INTERNAL MEDICINE

## 2024-01-31 PROCEDURE — 80061 LIPID PANEL: CPT | Mod: QW,S$GLB,, | Performed by: INTERNAL MEDICINE

## 2024-01-31 PROCEDURE — 83036 HEMOGLOBIN GLYCOSYLATED A1C: CPT | Mod: QW,S$GLB,, | Performed by: INTERNAL MEDICINE

## 2024-01-31 PROCEDURE — 99401 PREV MED CNSL INDIV APPRX 15: CPT | Mod: S$GLB,,, | Performed by: INTERNAL MEDICINE

## 2024-02-01 VITALS
SYSTOLIC BLOOD PRESSURE: 139 MMHG | DIASTOLIC BLOOD PRESSURE: 93 MMHG | WEIGHT: 255 LBS | HEIGHT: 63 IN | BODY MASS INDEX: 45.18 KG/M2

## 2024-02-01 LAB
GLUCOSE SERPL-MCNC: 114 MG/DL (ref 60–140)
HBA1C MFR BLD: 5.1 %
HDLC SERPL-MCNC: 36 MG/DL
POC CHOLESTEROL, LDL (DOCK): 131 MG/DL
POC CHOLESTEROL, TOTAL: 197 MG/DL
TRIGL SERPL-MCNC: 164 MG/DL

## 2024-02-14 ENCOUNTER — OFFICE VISIT (OUTPATIENT)
Dept: FAMILY MEDICINE | Facility: CLINIC | Age: 29
End: 2024-02-14
Payer: COMMERCIAL

## 2024-02-14 VITALS
BODY MASS INDEX: 45.32 KG/M2 | WEIGHT: 255.75 LBS | TEMPERATURE: 98 F | DIASTOLIC BLOOD PRESSURE: 82 MMHG | OXYGEN SATURATION: 98 % | RESPIRATION RATE: 18 BRPM | SYSTOLIC BLOOD PRESSURE: 128 MMHG | HEIGHT: 63 IN | HEART RATE: 81 BPM

## 2024-02-14 DIAGNOSIS — E66.01 CLASS 3 SEVERE OBESITY DUE TO EXCESS CALORIES WITHOUT SERIOUS COMORBIDITY WITH BODY MASS INDEX (BMI) OF 45.0 TO 49.9 IN ADULT: ICD-10-CM

## 2024-02-14 DIAGNOSIS — E11.9 TYPE 2 DIABETES MELLITUS WITHOUT COMPLICATION, WITHOUT LONG-TERM CURRENT USE OF INSULIN: ICD-10-CM

## 2024-02-14 DIAGNOSIS — E11.9 TYPE 2 DIABETES MELLITUS WITHOUT COMPLICATION: ICD-10-CM

## 2024-02-14 DIAGNOSIS — E28.2 PCOS (POLYCYSTIC OVARIAN SYNDROME): ICD-10-CM

## 2024-02-14 DIAGNOSIS — Z76.89 ENCOUNTER TO ESTABLISH CARE: Primary | ICD-10-CM

## 2024-02-14 PROCEDURE — 99999 PR PBB SHADOW E&M-EST. PATIENT-LVL IV: CPT | Mod: PBBFAC,,, | Performed by: STUDENT IN AN ORGANIZED HEALTH CARE EDUCATION/TRAINING PROGRAM

## 2024-02-14 PROCEDURE — 99204 OFFICE O/P NEW MOD 45 MIN: CPT | Mod: S$GLB,,, | Performed by: STUDENT IN AN ORGANIZED HEALTH CARE EDUCATION/TRAINING PROGRAM

## 2024-02-14 PROCEDURE — 3044F HG A1C LEVEL LT 7.0%: CPT | Mod: CPTII,S$GLB,, | Performed by: STUDENT IN AN ORGANIZED HEALTH CARE EDUCATION/TRAINING PROGRAM

## 2024-02-14 PROCEDURE — 1160F RVW MEDS BY RX/DR IN RCRD: CPT | Mod: CPTII,S$GLB,, | Performed by: STUDENT IN AN ORGANIZED HEALTH CARE EDUCATION/TRAINING PROGRAM

## 2024-02-14 PROCEDURE — 3008F BODY MASS INDEX DOCD: CPT | Mod: CPTII,S$GLB,, | Performed by: STUDENT IN AN ORGANIZED HEALTH CARE EDUCATION/TRAINING PROGRAM

## 2024-02-14 PROCEDURE — 3079F DIAST BP 80-89 MM HG: CPT | Mod: CPTII,S$GLB,, | Performed by: STUDENT IN AN ORGANIZED HEALTH CARE EDUCATION/TRAINING PROGRAM

## 2024-02-14 PROCEDURE — 3074F SYST BP LT 130 MM HG: CPT | Mod: CPTII,S$GLB,, | Performed by: STUDENT IN AN ORGANIZED HEALTH CARE EDUCATION/TRAINING PROGRAM

## 2024-02-14 PROCEDURE — 1159F MED LIST DOCD IN RCRD: CPT | Mod: CPTII,S$GLB,, | Performed by: STUDENT IN AN ORGANIZED HEALTH CARE EDUCATION/TRAINING PROGRAM

## 2024-02-14 RX ORDER — METFORMIN HYDROCHLORIDE 500 MG/1
500 TABLET, EXTENDED RELEASE ORAL
Qty: 90 TABLET | Refills: 3 | Status: SHIPPED | OUTPATIENT
Start: 2024-02-14 | End: 2024-03-01

## 2024-02-14 NOTE — PROGRESS NOTES
[facility]    Subjective:  Chief Complaint:   Chief Complaint   Patient presents with    Establish Care       History of Present Illness:  Milana is here for establishing care     PCOS  BMI 45.30  Metformin 500 daily - discontinued awhile ago   Ozempic 0.5 x 2 months, discontinued as she didn't see any results   A1c 6. 3 (1/30/23) > A1c 5.1 (1/31/24)   Reports dietary changes and daily exercise           ER visit s/p cxr at  s/p covid   EKG 1/28/24  NSR Incomplete RBBB  CXR One-view chest x-ray demonstrate a possible nodule versus infiltrate. On a two-view chest there is no evidence of any abnormality. No further workup indicated     Recommend COVID, flu vaccinations. Declined     Screening  Health Maintenance         Date Due Completion Date    Diabetes Urine Screening Never done ---    Pneumococcal Vaccines (Age 0-64) (1 of 2 - PCV) Never done ---    Foot Exam Never done ---    Eye Exam Never done ---    Influenza Vaccine (1) 09/01/2023 1/27/2023 (Declined)    Override on 1/27/2023: Declined    COVID-19 Vaccine (2 - 2023-24 season) 09/01/2023 10/4/2021    Hemoglobin A1c 07/31/2024 1/31/2024    Lipid Panel 01/31/2025 1/31/2024    Pap Smear 10/25/2027 10/25/2022    TETANUS VACCINE 07/16/2031 7/16/2021          Immunization History   Administered Date(s) Administered    COVID-19, MRNA, LN-S, PF (Pfizer) (Purple Cap) 10/04/2021    DTP 07/25/2000    DTaP 1995, 03/13/1996, 09/09/1996, 02/05/1997    HIB 1995, 01/09/1996, 03/13/1996, 02/05/1997    HPV Quadrivalent 10/23/2007, 12/28/2007, 05/27/2008    Hepatitis A 08/06/2007    Hepatitis B, Pediatric/Adolescent 1995, 1995, 03/13/1996    IPV 1995, 01/09/1996, 03/13/1996, 07/25/2000    Influenza - Quadrivalent - PF *Preferred* (6 months and older) 10/25/2021    MMR 09/11/1996, 07/25/2000    Meningococcal Conjugate (MCV4O) 2 Vial (2mo-55yr) 03/29/2012    Meningococcal Conjugate (MCV4P) 08/06/2007    PPD Test 04/24/2023     Tdap 08/06/2007, 07/16/2021     The ASCVD Risk score (Darin BLAIR, et al., 2019) failed to calculate for the following reasons:    The 2019 ASCVD risk score is only valid for ages 40 to 79    Allergies:  Review of patient's allergies indicates:   Allergen Reactions    Bactrim [sulfamethoxazole-trimethoprim]     Corticosteroids (glucocorticoids) Dermatitis       Home Medications:  Current Outpatient Medications on File Prior to Visit   Medication Sig    albuterol (VENTOLIN HFA) 90 mcg/actuation inhaler Inhale 2 puffs into the lungs every 6 (six) hours as needed for Wheezing. Rescue (Patient not taking: Reported on 2/14/2024)    brompheniramine-pseudoeph-DM (BROMFED DM) 2-30-10 mg/5 mL Syrp Take 10 mLs by mouth every 6 (six) hours as needed (cough). (Patient not taking: Reported on 2/14/2024)    semaglutide (OZEMPIC) 0.25 mg or 0.5 mg (2 mg/3 mL) pen injector Inject 0.25mg subQ q7d x 4 weeks, then increase to 0.5mg q7d. (Patient not taking: Reported on 1/22/2024)    [DISCONTINUED] azelastine (ASTELIN) 137 mcg (0.1 %) nasal spray 2 sprays (274 mcg total) by Nasal route 2 (two) times daily.    [DISCONTINUED] diclofenac sodium (VOLTAREN) 1 % Gel Apply 2 g topically 4 (four) times daily.    [DISCONTINUED] levonorgestrel (PLAN B ONE-STEP) 1.5 mg Tab tablet Take 1 tablet (1.5 mg total) by mouth once.    [DISCONTINUED] metFORMIN (GLUCOPHAGE) 500 MG tablet TAKE 1 TABLET BY MOUTH DAILY WITH A MEAL (Patient not taking: Reported on 1/22/2024)    [DISCONTINUED] FRANCIA, 28, 3-0.02 mg per tablet TAKE 1 TABLET BY MOUTH EVERY DAY     No current facility-administered medications on file prior to visit.       Past Medical History:   Diagnosis Date    Murmur     PCOS (polycystic ovarian syndrome)      Past Surgical History:   Procedure Laterality Date    ELBOW SURGERY      KNEE SURGERY      WISDOM TOOTH EXTRACTION       Family History   Problem Relation Age of Onset    Colon cancer Maternal Grandfather     Breast cancer Neg Hx     Ovarian  "cancer Neg Hx      Social History     Tobacco Use    Smoking status: Never     Passive exposure: Never    Smokeless tobacco: Never   Substance Use Topics    Alcohol use: No    Drug use: No            The patient's past medical history, surgical history, social history, family history, allergies and medications have been reviewed.    Review of Systems     10 point review of systems was conducted and only the pertinent positives and pertinent negatives are noted above in the HPI section.    Physical Examination  General appearance: alert, cooperative, no distress  HEENT: normocephalic, atraumatic, PERRLA   Lungs: clear to auscultation, no wheezes, rales or rhonchi, symmetric air entry  Heart: normal rate, regular rhythm, normal S1, S2, no murmurs, rubs, clicks or gallops  Abdomen: soft, nontender, nondistended, no rigidity, rebound, or guarding.   Skin: No rashes or abnormal skin lesions, no apparent jaundice or bruising  Extremities: Full ROM of all extremities, peripheral pulses normal, no unilateral leg swelling or calf tenderness   Neurological:alert, oriented, normal speech, no new focal findings or movement disorder noted from baseline      BP Readings from Last 3 Encounters:   02/14/24 128/82   01/31/24 (!) 139/93   01/28/24 (!) 131/97     Wt Readings from Last 3 Encounters:   02/14/24 116 kg (255 lb 11.7 oz)   01/31/24 115.7 kg (255 lb)   01/28/24 113.4 kg (250 lb)     /82 (BP Location: Left arm, Patient Position: Sitting, BP Method: Large (Manual))   Pulse 81   Temp 97.9 °F (36.6 °C) (Oral)   Resp 18   Ht 5' 3" (1.6 m)   Wt 116 kg (255 lb 11.7 oz)   LMP 11/04/2023 Comment: Irregular menstrual cycles due to PCOS  SpO2 98%   BMI 45.30 kg/m²       274}  Laboratory: I have reviewed old labs below:       274}    Lab Results   Component Value Date    WBC 9.8 04/21/2023    HGB 14.5 04/21/2023    HCT 43.5 04/21/2023    MCV 88 04/21/2023     04/21/2023     04/21/2023    K 4.5 04/21/2023    " CL 99 04/21/2023    CALCIUM 10.1 04/21/2023    CO2 22 04/21/2023     (H) 04/21/2023    BUN 14 04/21/2023    CREATININE 0.84 04/21/2023    EGFRNORACEVR 98 04/21/2023    ALBUMIN 4.9 04/21/2023    BILITOT 0.5 04/21/2023    ALT 22 04/21/2023    AST 15 04/21/2023    CHOL 204 (H) 04/21/2023    TRIG 155 (H) 04/21/2023    HDL 52 04/21/2023    LDLCALC 125 (H) 04/21/2023    HGBA1C 6.3 (H) 01/30/2023      Lab reviewed by me: Particular labs of significance that I will monitor, workup, or treat to improve are mentioned below in diagnostic impression remarks.    Imaging/EKG: I have reviewed the pertinent results and my findings are noted in remarks.     274}    CC:   Chief Complaint   Patient presents with    Establish Care           274}    Assessment/Plan  Milana Navarro is a 28 y.o. female who presents to clinic with:  1. Encounter to establish care    2. Type 2 diabetes mellitus without complication, without long-term current use of insulin    3. Class 3 severe obesity due to excess calories without serious comorbidity with body mass index (BMI) of 45.0 to 49.9 in adult    4. PCOS (polycystic ovarian syndrome)          274}  Diagnostic Impression Remarks       28 y.o. female who presents to clinic today for est care     This is the extent of this pleasant patient's concerns at this present time.  I also discussed the importance of close follow up to discuss labs, change or modify her medications if needed, monitor side effects, and further evaluation of medical problems.     Additional workup planned: see labs ordered below.    See below for labs and meds ordered with associated diagnosis      1. Encounter to establish care    2. Type 2 diabetes mellitus without complication, without long-term current use of insulin  - metFORMIN (GLUCOPHAGE-XR) 500 MG ER 24hr tablet; Take 1 tablet (500 mg total) by mouth daily with breakfast.  Dispense: 90 tablet; Refill: 3  Controlled. A1c wnl    3. Class 3 severe obesity due to  excess calories without serious comorbidity with body mass index (BMI) of 45.0 to 49.9 in adult  - orlistat (BEV) 60 MG capsule; Take 1 capsule (60 mg total) by mouth 3 (three) times daily with meals.  Dispense: 90 capsule; Refill: 11    4. PCOS (polycystic ovarian syndrome)  - metFORMIN (GLUCOPHAGE-XR) 500 MG ER 24hr tablet; Take 1 tablet (500 mg total) by mouth daily with breakfast.  Dispense: 90 tablet; Refill: 3  Does not wish to restart OCP at this time, not interested in Mirena   Reassurance  Continue lifestyle modifications   Discussed that ozempic will not be covered by insurance with normal A1c      - Transition to a low salt, mediterranean-style diet which emphasizes:  Eating primarily plant-based foods, such as fruits and vegetables, whole grains, legumes and nuts   Replacing butter with healthy fats, such as olive oil   Using herbs and spices instead of salt to flavor foods   Limiting red meat to no more than a few times a month   Eating fish and poultry at least twice a week      - Patient counseled on benefits of regular exercise.  Initiation of a graded aerobic exercise plan (goal 30-45 minutes per day 4-5 times per week)  Patient encouraged to participate in low intensity strength exercising and if unfamiliar with strength and conditioning training, I recommend joining a gym with access to a  to further instruct the patient.      - If weight/obesity is a concern a reasonable weight loss goal of 1-2lbs per month to reach a goal of 5-10% weight loss in 5-6 months, or a BMI less than 25.    RTC 3 mo follow up or PRN     Mary Aleman MD, [facility]   Family Medicine Physician  02/14/2024      If you are due for any health screening(s) below please notify me so we can arrange them to be ordered and scheduled to maintain your health.     Health Maintenance Due   Topic    Diabetes Urine Screening     Eye Exam                   Diabetic Retinal Eye Exam    Diabetes is the #1 cause of  blindness in the [place] - early detection before signs or symptoms develop can prevent debilitating blindness.    Once-a-year screening is recommended for all diabetic patients. This exam can prevent and treat diabetes complications in the eye before developing symptoms. This can be done with a special camera is used to take photographs of the back of your eye without having to dilate them, or you can see an eye doctor for a full dilated exam.      The following information is provided to all patients.  This information is to help you find resources for any of the problems found today that may be affecting your health:                Living healthy guide: [email or web address].[place].[facility]       Understanding Diabetes: www.diabetes.org       Eating healthy: www.cdc.gov/healthyweight      CDC home safety checklist: www.cdc.gov/steadi/patient.html      Agency on Aging: www.goea.[place].[facility]       Alcoholics anonymous (AA): www.aa.org      Physical Activity: www.[place].nih.gov/sa5dwdl       Tobacco use: www.quitwithusla.org       Obesity  Lifestyle Modifications:  Physical activity   -Initiation of a graded aerobic exercise plan (goal 30-45 minutes per day 4-5 times per week)  -Participate in low intensity strength exercising and if unfamiliar with strength and conditioning training, I recommend joining a gym with access to a  to further instruct the patient.   Balanced diet  -Transition to a low salt, mediterranean-style diet which emphasizes:  Eating primarily plant-based foods, such as fruits and vegetables, whole grains, legumes and nuts   Replacing butter with healthy fats, such as olive oil   Using herbs and spices instead of salt to flavor foods   Limiting red meat to no more than a few times a month   Eating fish and poultry at least twice a week      A reasonable weight loss goal of 1-2lbs per month to reach a goal of 5-10% weight loss in 5-6 months, or a BMI less than 25.    Drug Therapy, in addition to  lifestyle modifications   Consistently use contraception to avoid pregnancy because of the increased risk of teratogenicity    Phentermine/topiramate (Qsymia) 3.75/23 mg starter dose x 2 weeks , increase to 7.5/46  After 12 weeks at the recommended dose, if adult patients have not lost at least 3% of baseline body weight or pediatric patients have not experienced a reduction of at least 3% of baseline BMI, increase the dosage  The suggested follow-up is 2-8 weeks  It is not known if Qsymia changes your risk of heart problems or stroke or of death due to heart problems or stroke  It is not known if Qsymia is safe and effective when taken with other prescription, over-the-counter, or herbal weight loss products  Contraindictions: Pregnancy. Glaucoma. Hyperthyroidism. During or within 14 days following the administration of monoamine oxidase inhibitors. Known hypersensitivity or idiosyncrasy to the sympathomimetic amines.  https://hcp.[employer].com/prescribing/    Orlistat (Xenical)   The most common side effect of orlistat use is steatorrhea, which occurs because of the impaired absorption of dietary fat. Other side effects include fecal spotting, diarrhea, abdominal pain, and anal fissures.  Orlistat is contraindicated in: Hypersensitivity to orlistat or its constituents, Chronic malabsorption, Cholestasis, Anorexia and bulimia, Pregnancy, Severe renal impairment.  Use with caution in patients with anorexia or bulimia nervosa.    Adipex   Advised adipex is intended for short term use only with max of 3 mths tx. Discussed potential risks and side effects of adipex - dry mouth, flushing/sweating, increased HR/palpitations, increased BP, or possible heart rhythm disturbance. Rx requires monthly visit to monitor weight and BP, and may be discontinued if not losing weight. Adipex must be used in conjunction with healthy, reduced calorie diet and exercise. Advised weight loss achieved with adipex will not be maintained  without permanent behavioral and lifestyle changes. Patient voices understanding and feels potential benefit outweighs risk and after shared decision making will send in a prescription.  Adipex in contraindicated in: history of cardiovascular disease (eg, coronary artery disease, stroke, arrhythmias, congestive heart failure, uncontrolled hypertension). Hyperthyroidism. Glaucoma. Agitated states.    Bupropion/naltrexone (Contrave) 8/90 mg  Wellbutrin and naltrexone for weight loss. Medication, side effects and proper use discussed. Common side effects include nausea or vomiting, dizziness, dry mouth, constipation and headache. Serious side effects include seizures, allergic reactions and mental disturbances like suicidal thoughts, irritability, anxiety or agitation and some skin reactions. Bupropion is the active ingredient responsible for Contrave's black box warning. In general, many antidepressants are known to stimulate suicidal thoughts and behaviours in children, adolescents and young adults (i.e. those under 25 years of age) with major depressive or psychiatric disorders. Pt verbalized understanding and was in agreement with the plan.    Low-dose Naltrexone aids in weight loss, especially for those with insulin resistance or overeating issues. Combined with a healthy diet and moderate exercise, it often yields significant results. It's commonly prescribed with bupropion as Contrave®. Taking Naltrexone in the early morning can reduce appetite by 30%, aiding weight loss. Morning dosing suppresses cravings all day, enhancing your weight loss regimen.     Liraglutide (if failure to lose AND BMI >40 or >35 with comorbidity)    Ozempic  Discussed MOA, side effects, including n/v/pancreatitis/medullary thyroid ca. Instructed patient to notify me of any n/v/abdominal pain. Discussed proper dosing and administration.  Discussed that this medication is well known to cause weight loss and obesity is a large risk factor  for multiple different cancers and by losing weight this can decrease the overall cancer risk of multiple organs.  This class of medication also has cardiovascular benefits and multiple studies show benefits decreasing cardiovascular risk.  We believe that the benefits outweigh the risks and after shared decision-making the patient decided to try this medication.   Counseled on use of contraception while on ozempic, and immediate discontinuation if she were to become pregnant.    Patient denies any personal or family history of Medullary thyroid carcinoma (MTC), multiple endocrine neoplasia syndrome type 2 (MEN 2), thyroid cancer, thyroid C-cell tumors , nor h/o pancreatitis.   Ozempic® is contraindicated if you or any of your family have ever had MTC, or if you have an endocrine system condition called Multiple Endocrine Neoplasia syndrome type 2 (MEN 2).    Consider Bariatric surgery         About The Book  Improve all areas of your health from your weight, sleep, cravings, mood, energy, skin, and even slow down aging, with easy-to-implement, science-based hacks to manage your blood sugar levels while still eating the foods you love.    Glucose, or blood sugar, is a tiny molecule in our body that has a huge impact on our health. It enters our bloodstream through the starchy or sweet foods we eat. Ninety percent of us suffer from too much glucose in our system--and most of us don't know it.    The symptoms? Cravings, fatigue, infertility, hormonal issues, acne, wrinkles And over time, the development of conditions like type 2 diabetes, polycystic ovarian syndrome, cancer, dementia, and heart disease.    Drawing on cutting-edge science and her own pioneering research,  Nay Wilburn offers ten simple, surprising hacks to help you balance your glucose levels and reverse your symptoms--without going on a diet or giving up the foods you love. For example:  * How eating foods in the right order will make  you lose weight effortlessly  * What secret ingredient will allow you to eat dessert and still go into fat-burning mode  * What small change to your breakfast will unlock energy and cut your cravings    Both entertaining, informative, and packed with the latest scientific data, this book presents a new way to think about better health. Glucose Revolution is chock-full of tips that can drastically and immediately improve your life, whatever your dietary preferences.    About the author     Nay Wilburn is on a mission to translate cutting-edge science into easy tips to help people improve their physical and mental health. Shes the  of the [employer] popular [employer] account [email or web address] where she teaches tens of thousands of people about healthy food habits. She holds a Bachelor of Science in mathematics from [employer], [place], and a Master of Science in biochemistry from [employer]. 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 Glucose Revolution is her first book.

## 2024-02-14 NOTE — PATIENT INSTRUCTIONS
Douglas Cortés,     If you are due for any health screening(s) below please notify me so we can arrange them to be ordered and scheduled. Most healthy patients at your age complete them, but you are free to accept or refuse.     If you can't do it, I'll definitely understand. If you can, I'd certainly appreciate it!    All of your core healthy metrics are met.

## 2024-03-01 ENCOUNTER — ON-DEMAND VIRTUAL (OUTPATIENT)
Dept: URGENT CARE | Facility: CLINIC | Age: 29
End: 2024-03-01
Payer: COMMERCIAL

## 2024-03-01 DIAGNOSIS — N94.6 DYSMENORRHEA: Primary | ICD-10-CM

## 2024-03-01 PROCEDURE — 99203 OFFICE O/P NEW LOW 30 MIN: CPT | Mod: 95,,, | Performed by: NURSE PRACTITIONER

## 2024-03-01 RX ORDER — IBUPROFEN 800 MG/1
800 TABLET ORAL 3 TIMES DAILY
Qty: 30 TABLET | Refills: 0 | Status: SHIPPED | OUTPATIENT
Start: 2024-03-01 | End: 2024-03-11

## 2024-03-01 NOTE — PROGRESS NOTES
Subjective:      Patient ID: Milana Navarro is a 28 y.o. female.    Vitals:  vitals were not taken for this visit.     Chief Complaint: Menstrual Problem      Visit Type: TELE AUDIOVISUAL    Present with the patient at the time of consultation: TELEMED PRESENT WITH PATIENT: None  Patient Location: Home  Past Medical History:   Diagnosis Date    Murmur     PCOS (polycystic ovarian syndrome)      Past Surgical History:   Procedure Laterality Date    ELBOW SURGERY      KNEE SURGERY      WISDOM TOOTH EXTRACTION       Review of patient's allergies indicates:   Allergen Reactions    Bactrim [sulfamethoxazole-trimethoprim]     Corticosteroids (glucocorticoids) Dermatitis     Current Outpatient Medications on File Prior to Visit   Medication Sig Dispense Refill    [DISCONTINUED] albuterol (VENTOLIN HFA) 90 mcg/actuation inhaler Inhale 2 puffs into the lungs every 6 (six) hours as needed for Wheezing. Rescue (Patient not taking: Reported on 2/14/2024) 18 g 0    [DISCONTINUED] azelastine (ASTELIN) 137 mcg (0.1 %) nasal spray 2 sprays (274 mcg total) by Nasal route 2 (two) times daily. 30 mL 0    [DISCONTINUED] brompheniramine-pseudoeph-DM (BROMFED DM) 2-30-10 mg/5 mL Syrp Take 10 mLs by mouth every 6 (six) hours as needed (cough). (Patient not taking: Reported on 2/14/2024) 118 mL 0    [DISCONTINUED] diclofenac sodium (VOLTAREN) 1 % Gel Apply 2 g topically 4 (four) times daily. 1 Tube 0    [DISCONTINUED] levonorgestrel (PLAN B ONE-STEP) 1.5 mg Tab tablet Take 1 tablet (1.5 mg total) by mouth once. 1 tablet 0    [DISCONTINUED] metFORMIN (GLUCOPHAGE-XR) 500 MG ER 24hr tablet Take 1 tablet (500 mg total) by mouth daily with breakfast. 90 tablet 3    [DISCONTINUED] FRANCIA, 28, 3-0.02 mg per tablet TAKE 1 TABLET BY MOUTH EVERY DAY 28 tablet 0    [DISCONTINUED] orlistat (BEV) 60 MG capsule Take 1 capsule (60 mg total) by mouth 3 (three) times daily with meals. 90 capsule 11    [DISCONTINUED] semaglutide (OZEMPIC) 0.25 mg or 0.5 mg  (2 mg/3 mL) pen injector Inject 0.25mg subQ q7d x 4 weeks, then increase to 0.5mg q7d. (Patient not taking: Reported on 1/22/2024) 9 mL 3     No current facility-administered medications on file prior to visit.     Family History   Problem Relation Age of Onset    Colon cancer Maternal Grandfather     Breast cancer Neg Hx     Ovarian cancer Neg Hx        Medications Ordered                [id number] [place] - [address] [facility] [address]   [address] [address]    Telephone: [phone]   Fax: [phone]   Hours: Not open 24 hours                         E-Prescribed (1 of 1)              ibuprofen (ADVIL,MOTRIN) 800 MG tablet    Sig: Take 1 tablet (800 mg total) by mouth 3 (three) times daily. Take with food. for 10 days       Start: 3/1/24     Quantity: 30 tablet Refills: 0                           Ohs Peq Odvv Intake    3/1/2024 11:14 AM CST - Filed by Patient   What is your current physical address in the event of a medical emergency? [place]   Are you able to take your vital signs? No   Please attach any relevant images or files          Hx of PCOS. Menstrual cycle started yesterday after not having one for 6 months. Intense cramping, heavy bleeding and clots currently. Seeking further treatment options at this time.        Cardiovascular:  Negative for chest pain, palpitations and sob on exertion.   Genitourinary:  Positive for irregular menstruation, heavy menstrual bleeding, ovarian cysts, vaginal bleeding and pelvic pain. Negative for vaginal discharge and vaginal odor.   Neurological:  Negative for dizziness.        Objective:   The physical exam was conducted virtually.  Physical Exam   Constitutional: She is oriented to person, place, and time. She does not appear ill. No distress.   HENT:   Head: Normocephalic and atraumatic.   Nose: Nose normal.   Eyes: Extraocular movement intact   Pulmonary/Chest: Effort normal.   Abdominal:  Normal appearance.   Musculoskeletal: Normal range of motion.         General: Normal range of motion.   Neurological: no focal deficit. She is alert and oriented to person, place, and time.   Psychiatric: Her behavior is normal. Mood normal.   Vitals reviewed.      Assessment:     1. Dysmenorrhea        Plan:     Patient encouraged to monitor symptoms closely and instructed to follow-up for new or worsening symptoms. Further, in-person, evaluation may be necessary for continued treatment. Please follow up with your primary care doctor or specialist as needed. Verbally discussed plan. Patient confirms understanding and is in agreement with treatment and plan.     You must understand that you've received a Virtual Care evaluation only and that you may be released before all your medical problems are known or treated. You, the patient, will arrange for follow up care as instructed.    Dysmenorrhea  -     ibuprofen (ADVIL,MOTRIN) 800 MG tablet; Take 1 tablet (800 mg total) by mouth 3 (three) times daily. Take with food. for 10 days  Dispense: 30 tablet; Refill: 0

## 2024-03-01 NOTE — LETTER
March 1, 2024    Milana Navarro  [address] [place] [address]             Virtual Visit - Urgent Care  Urgent Care  [address] [facility] [address] [facility] [address]   March 1, 2024     Patient: Milana Navarro   YOB: 1995   Date of Visit: 3/1/2024       To Whom it May Concern:    Milana Navarro was seen virtually on 3/1/2024. She may return to work on or after 3/2/24 provided symptoms have improved .    Please excuse her from any classes or work missed.    If you have any questions or concerns, please don't hesitate to call.    Sincerely,         Camila Loo, NP

## 2024-03-20 ENCOUNTER — OCCUPATIONAL HEALTH (OUTPATIENT)
Dept: URGENT CARE | Facility: CLINIC | Age: 29
End: 2024-03-20

## 2024-03-20 DIAGNOSIS — Z02.83 ENCOUNTER FOR DRUG SCREENING: Primary | ICD-10-CM

## 2024-03-20 LAB
CTP QC/QA: YES
POC 10 PANEL DRUG SCREEN: NEGATIVE

## 2024-03-20 PROCEDURE — 80305 DRUG TEST PRSMV DIR OPT OBS: CPT | Mod: S$GLB,,, | Performed by: SURGERY

## 2024-03-27 DIAGNOSIS — E11.9 TYPE 2 DIABETES MELLITUS WITHOUT COMPLICATION, UNSPECIFIED WHETHER LONG TERM INSULIN USE: ICD-10-CM

## 2024-04-01 ENCOUNTER — OFFICE VISIT (OUTPATIENT)
Dept: FAMILY MEDICINE | Facility: CLINIC | Age: 29
End: 2024-04-01
Payer: COMMERCIAL

## 2024-04-01 DIAGNOSIS — R11.0 NAUSEA: ICD-10-CM

## 2024-04-01 DIAGNOSIS — E11.9 TYPE 2 DIABETES MELLITUS WITHOUT COMPLICATION, WITHOUT LONG-TERM CURRENT USE OF INSULIN: ICD-10-CM

## 2024-04-01 DIAGNOSIS — R19.7 DIARRHEA, UNSPECIFIED TYPE: Primary | ICD-10-CM

## 2024-04-01 DIAGNOSIS — R10.9 ABDOMINAL CRAMPING: ICD-10-CM

## 2024-04-01 PROCEDURE — 3044F HG A1C LEVEL LT 7.0%: CPT | Mod: CPTII,95,, | Performed by: STUDENT IN AN ORGANIZED HEALTH CARE EDUCATION/TRAINING PROGRAM

## 2024-04-01 PROCEDURE — 99214 OFFICE O/P EST MOD 30 MIN: CPT | Mod: 95,,, | Performed by: STUDENT IN AN ORGANIZED HEALTH CARE EDUCATION/TRAINING PROGRAM

## 2024-04-01 RX ORDER — AZITHROMYCIN 500 MG/1
500 TABLET, FILM COATED ORAL DAILY
Qty: 3 TABLET | Refills: 0 | Status: SHIPPED | OUTPATIENT
Start: 2024-04-01 | End: 2024-04-04

## 2024-04-01 RX ORDER — LOPERAMIDE HYDROCHLORIDE 2 MG/1
2 CAPSULE ORAL 4 TIMES DAILY PRN
Qty: 12 CAPSULE | Refills: 1 | Status: SHIPPED | OUTPATIENT
Start: 2024-04-01 | End: 2024-04-04

## 2024-04-01 RX ORDER — ONDANSETRON 4 MG/1
4 TABLET, ORALLY DISINTEGRATING ORAL EVERY 6 HOURS PRN
Qty: 18 TABLET | Refills: 0 | Status: SHIPPED | OUTPATIENT
Start: 2024-04-01 | End: 2024-04-04

## 2024-04-01 NOTE — PROGRESS NOTES
"[facility]    Subjective:    Chief Complaint:   Chief Complaint   Patient presents with    Diarrhea      274}    The HPI and pertinent ROS is included in the Diagnostic Impression Remarks section at the end of the note. Please see below for further details.     Milana is a pleasant intelligent patient who is here for evaluation.     The following portions of the patient's history were reviewed and updated as appropriate: allergies, current medications, past family history, past medical history, past social history, past surgical history and problem list.    She  has a past medical history of Murmur and PCOS (polycystic ovarian syndrome).  She  has a past surgical history that includes Elbow surgery; [place] tooth extraction; and Knee surgery.  She  reports that she has never smoked. She has never been exposed to tobacco smoke. She has never used smokeless tobacco. She reports that she does not drink alcohol and does not use drugs.  She family history includes Colon cancer in her maternal grandfather.    Review of patient's allergies indicates:   Allergen Reactions    Bactrim [sulfamethoxazole-trimethoprim]     Corticosteroids (glucocorticoids) Dermatitis       Physical Examination  There were no vitals taken for this visit.   274}  Wt Readings from Last 3 Encounters:   02/14/24 116 kg (255 lb 11.7 oz)   01/31/24 115.7 kg (255 lb)   01/28/24 113.4 kg (250 lb)     BP Readings from Last 3 Encounters:   02/14/24 128/82   01/31/24 (!) 139/93   01/28/24 (!) 131/97     Estimated body mass index is 45.3 kg/m² as calculated from the following:    Height as of 2/14/24: 5' 3" (1.6 m).    Weight as of 2/14/24: 116 kg (255 lb 11.7 oz).     CONSTITUTIONAL: No apparent distress. Does not appear acutely ill or septic. Appears adequately hydrated.  PULM: Breathing unlabored.  PSYCHIATRIC: Alert and conversant and grossly oriented. Mood is grossly neutral. Affect appropriate. Judgment and insight grossly " intact.  Integument: normal coloration and turgor, no rashes, no suspicious skin lesions noted.    Data reviewed 274}  Previous medical records reviewed and summarized in HPI.     Laboratory  274}  I have reviewed old labs below:  Lab Results   Component Value Date    WBC 9.8 04/21/2023    HGB 14.5 04/21/2023    HCT 43.5 04/21/2023    MCV 88 04/21/2023     04/21/2023     04/21/2023    K 4.5 04/21/2023    CL 99 04/21/2023    CALCIUM 10.1 04/21/2023    CO2 22 04/21/2023     (H) 04/21/2023    BUN 14 04/21/2023    CREATININE 0.84 04/21/2023    ALBUMIN 4.9 04/21/2023    BILITOT 0.5 04/21/2023    ALT 22 04/21/2023    AST 15 04/21/2023    CHOL 204 (H) 04/21/2023    TRIG 155 (H) 04/21/2023    HDL 52 04/21/2023    LDLCALC 125 (H) 04/21/2023    HGBA1C 6.3 (H) 01/30/2023     Lab reviewed by me: Particular labs of significance that I will monitor, workup, or treat to improve are mentioned below in diagnostic impression remarks.  :[id number]}274}  Imaging/EKG: I have reviewed the pertinent results/findings and my personal findings are noted below in diagnostic impression remarks.  274}    CC:   Chief Complaint   Patient presents with    Diarrhea        274}  Assessment/Plan  Milana Navarro is a 28 y.o. female who presents with:    1. Diarrhea, unspecified type    2. Nausea    3. Abdominal cramping    4. Type 2 diabetes mellitus without complication, without long-term current use of insulin        Diagnostic Impression Remarks + HPI     The patient location is:  Patient Home [place]  The chief complaint leading to consultation is:   Chief Complaint   Patient presents with    Diarrhea     Total time spent with patient: 20 minutes     Visit type: Virtual visit with synchronous audio and video  Each patient to whom he or she provides medical services by telemedicine is:  (1) informed of the relationship between the physician and patient and the respective role of any other health care provider with respect to  "management of the patient; and (2) notified that he or she may decline to receive medical services by telemedicine and may withdraw from such care at any time.    This service was not originating from a related E/M service provided within the previous 7 days nor will  to an E/M service or procedure within the next 24 hours or my soonest available appointment.  Prevailing standard of care was able to be met in this synchronous audio and video visit    Documentation entered by me for this encounter may have been done in part using speech-recognition technology. Although I have made an effort to ensure accuracy, "sound like" errors may exist and should be interpreted in context.     Diarrhea-pain patient reports that yesterday she would some barbecue along with some other foods in the develops some diarrhea starting at around 1:00 a.m. and she has frequent bowel movements she did have some nausea 1 episode of vomiting.  Reports no fever no blood in stool and she has no right upper quadrant pain no right lower quadrant pain.  Patient reports has some abdominal cramping and some nausea still having some diarrhea.  She reports no travel.  She has not know if other people had similar symptoms.  Will send in azithromycin along with Zofran loperamide.  Patient reports no in antibiotic use suspect food poisoning  Nausea-will send in Zofran no fever chills suspect due to infection  Diabetes stable reports her glucose levels been well controlled last A1c in goal  Abdominal cramping-patient reports some abdominal cramping can consider bentyl until but will watch and monitor      This is the extent of the patient's complaints at this present time. She denies chest pain upon exertion, dyspnea, nausea, vomiting, diaphoresis, and syncope. No pleuritic chest pain, unilateral leg swelling, calf tenderness, or calf pain. Denies unintentional wt loss sweats and fevers.     Milana will return to clinic in a few months for further " workup and reassessment or sooner as needed. She was instructed to call the clinic or go to the emergency department if her symptoms do not improve, worsens, or if new symptoms develop. As we discussed that symptoms could worsen over the next 24 hours she was advised that if any increased swelling, pain, or numbness arise to go immediately to the ED. Patient knows to call any time if an emergency arises. Shared decision making occurred and she verbalized understanding in agreement with this plan.      274}      She was counseled about the importance of cancer screening.     Medication Monitoring    In today's visit, monitoring for drug toxicity was accomplished. Proper use of medications was also discussed.     I discussed imaging findings, diagnosis, possibilities, treatment options, medications, risks, and benefits. She had many questions regarding the options and long-term effects. All questions were answered. She expressed understanding after counseling regarding the diagnosis and recommendations. She was capable and demonstrated competence with understanding of these options. Shared decision making was performed resulting in her choosing the current treatment plan. Patient handout was given with instructions and recommendations. Advised the patient that if they become pregnant to alert us immediately to assess for medication changes.     I also discussed the importance of close follow up to discuss labs, change or modify her medications if needed, monitor side effects, and further evaluation of medical problems.     Additional workup planned: see labs ordered below.    See below for labs and meds ordered with associated diagnosis    1. Diarrhea, unspecified type  - azithromycin (ZITHROMAX) 500 MG tablet; Take 1 tablet (500 mg total) by mouth once daily. for 3 days  Dispense: 3 tablet; Refill: 0  - loperamide (IMODIUM) 2 mg capsule; Take 1 capsule (2 mg total) by mouth 4 (four) times daily as needed for  "Diarrhea.  Dispense: 12 capsule; Refill: 1    2. Nausea  - ondansetron (ZOFRAN-ODT) 4 MG TbDL; Take 1 tablet (4 mg total) by mouth every 6 (six) hours as needed (nausea).  Dispense: 18 tablet; Refill: 0    3. Abdominal cramping    4. Type 2 diabetes mellitus without complication, without long-term current use of insulin       Modified Medications    No medications on file       Huy Heath MD  04/01/2024     Documentation entered by me for this encounter may have been done in part using speech-recognition technology. Although I have made an effort to ensure accuracy, "sound like" errors may exist and should be interpreted in context.    If you are due for any health screening(s) below please notify me so we can arrange them to be ordered and scheduled to maintain your health. Most healthy patients at your age complete them, but you are free to accept or refuse.   Tests to Keep You Healthy    Eye Exam: ORDERED BUT NOT SCHEDULED  Cervical Cancer Screening: Met on 11/15/2023  Last HbA1c < 8 (01/31/2024): Yes       "

## 2024-04-04 ENCOUNTER — PATIENT MESSAGE (OUTPATIENT)
Dept: ADMINISTRATIVE | Facility: HOSPITAL | Age: 29
End: 2024-04-04
Payer: COMMERCIAL

## 2024-04-05 ENCOUNTER — TELEPHONE (OUTPATIENT)
Dept: PHARMACY | Facility: CLINIC | Age: 29
End: 2024-04-05
Payer: COMMERCIAL

## 2024-06-05 ENCOUNTER — PATIENT MESSAGE (OUTPATIENT)
Dept: FAMILY MEDICINE | Facility: CLINIC | Age: 29
End: 2024-06-05
Payer: COMMERCIAL

## 2024-07-09 ENCOUNTER — HOSPITAL ENCOUNTER (EMERGENCY)
Facility: HOSPITAL | Age: 29
Discharge: HOME | End: 2024-07-09
Attending: STUDENT IN AN ORGANIZED HEALTH CARE EDUCATION/TRAINING PROGRAM
Payer: COMMERCIAL

## 2024-07-09 VITALS
TEMPERATURE: 98 F | DIASTOLIC BLOOD PRESSURE: 78 MMHG | HEART RATE: 92 BPM | RESPIRATION RATE: 16 BRPM | HEIGHT: 63 IN | WEIGHT: 255 LBS | OXYGEN SATURATION: 99 % | BODY MASS INDEX: 45.18 KG/M2 | SYSTOLIC BLOOD PRESSURE: 132 MMHG

## 2024-07-09 DIAGNOSIS — M25.571 RIGHT ANKLE PAIN: ICD-10-CM

## 2024-07-09 DIAGNOSIS — M79.604 RIGHT LEG PAIN: ICD-10-CM

## 2024-07-09 DIAGNOSIS — M79.671 RIGHT FOOT PAIN: ICD-10-CM

## 2024-07-09 PROCEDURE — 99283 EMERGENCY DEPT VISIT LOW MDM: CPT | Mod: 25

## 2024-07-09 PROCEDURE — 25000003 PHARM REV CODE 250: Performed by: STUDENT IN AN ORGANIZED HEALTH CARE EDUCATION/TRAINING PROGRAM

## 2024-07-09 RX ORDER — OXYCODONE AND ACETAMINOPHEN 5; 325 MG/1; MG/1
1 TABLET ORAL
Status: DISCONTINUED | OUTPATIENT
Start: 2024-07-09 | End: 2024-07-09 | Stop reason: HOSPADM

## 2024-07-09 RX ORDER — ACETAMINOPHEN 325 MG/1
650 TABLET ORAL
Status: COMPLETED | OUTPATIENT
Start: 2024-07-09 | End: 2024-07-09

## 2024-07-09 RX ADMIN — ACETAMINOPHEN 650 MG: 325 TABLET ORAL at 05:07

## 2024-07-09 NOTE — ED PROVIDER NOTES
Encounter Date: 7/9/2024       History     Chief Complaint   Patient presents with    Ankle Pain     Right ankle injury, obvious deformity and swelling     HPI  27 yo woman presenting with R ankle pain. 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 This was within 2 hours of arrival to the ER. R ankle pain and swelling. No relevant hx. Not on blood thinners. Pt says she does not like how pain meds makes her feel. Took ibuprofen and okay with tylenol but does not think she wants anything stronger. Hesitant to move ankle or bear weight because she has severe pain with movement of the [place]. Fell backward and thinks she may have hit the back of her head but says she is unsure. No loc. No confusion after. Witnessed by coworker. No headache, nausea, vomiting, vision changes, or any other neuro sx/  Review of patient's allergies indicates:   Allergen Reactions    Bactrim [sulfamethoxazole-trimethoprim]     Corticosteroids (glucocorticoids) Dermatitis     Past Medical History:   Diagnosis Date    Murmur     PCOS (polycystic ovarian syndrome)      Past Surgical History:   Procedure Laterality Date    ELBOW SURGERY      KNEE SURGERY      WISDOM TOOTH EXTRACTION       Family History   Problem Relation Name Age of Onset    Colon cancer Maternal Grandfather      Breast cancer Neg Hx      Ovarian cancer Neg Hx       Social History     Tobacco Use    Smoking status: Never     Passive exposure: Never    Smokeless tobacco: Never   Substance Use Topics    Alcohol use: No    Drug use: No     Review of Systems   Constitutional:  Negative for chills and fever.   HENT:  Negative for congestion and sore throat.    Eyes:  Negative for redness and visual disturbance.   Respiratory:  Negative for cough and shortness of breath.    Cardiovascular:  Negative for chest pain, palpitations and leg swelling.   Gastrointestinal:  Negative for abdominal pain, blood in stool, constipation, diarrhea,  nausea and vomiting.   Genitourinary:  Negative for dysuria, frequency and hematuria.   Musculoskeletal:  Positive for arthralgias (R ankle pain). Negative for back pain, joint swelling, neck pain and neck stiffness.   Skin:  Negative for rash and wound.   Neurological:  Negative for weakness and numbness.   Psychiatric/Behavioral:  Negative for confusion.        Physical Exam     Initial Vitals [07/09/24 1536]   BP Pulse Resp Temp SpO2   138/88 105 18 98.1 °F (36.7 °C) 95 %      MAP       --         Physical Exam    Nursing note and vitals reviewed.  Constitutional: She appears well-developed. She is not diaphoretic.   HENT:   Head: Normocephalic and atraumatic.   Eyes: Right eye exhibits no discharge. Left eye exhibits no discharge. No scleral icterus.   Neck: Neck supple. No tracheal deviation present.   Cardiovascular:  Normal rate and regular rhythm.           Pulmonary/Chest: Breath sounds normal. No stridor. No respiratory distress. She has no wheezes. She has no rhonchi. She has no rales.   Abdominal: Abdomen is soft. She exhibits no distension. There is no abdominal tenderness. There is no rebound and no guarding.   Musculoskeletal:         General: Edema (R lateral malleolus) present.      Cervical back: Neck supple.      Comments: R ankle able to dorsiflex, plantarflex, and bisi and invert but all rom causes pain to entire ankle area. No ttp forefoot or tib fib or rest of extremiteis     Neurological: She is alert and oriented to person, place, and time.   Skin: Skin is warm and dry.         ED Course   Procedures  Labs Reviewed - No data to display       Imaging Results              X-Ray Foot Complete Right (Final result)  Result time 07/09/24 17:31:07      Final result by Rea Jimenez MD (07/09/24 17:31:07)                   Impression:      As above      Electronically signed by: Rea Jimenez MD  Date:    07/09/2024  Time:    17:31               Narrative:    EXAMINATION:  XR FOOT COMPLETE 3  VIEW RIGHT    CLINICAL HISTORY:  . Pain in right foot    TECHNIQUE:  AP, lateral, and oblique views of the right foot were performed.    COMPARISON:  None    FINDINGS:  No acute fracture or dislocation.  Plantar and dorsal calcaneal spurs.                                       X-Ray Tibia Fibula 2 View Right (Final result)  Result time 07/09/24 17:29:50      Final result by Rea Jimenez MD (07/09/24 17:29:50)                   Impression:      As above      Electronically signed by: Rea Jimenez MD  Date:    07/09/2024  Time:    17:29               Narrative:    EXAMINATION:  XR TIBIA FIBULA 2 VIEW RIGHT    CLINICAL HISTORY:  Pain in right leg    TECHNIQUE:  AP and lateral views of the right tibia and fibula were performed.    COMPARISON:  05/29/2018    FINDINGS:  No acute fracture or dislocation                                       X-Ray Ankle Complete Right (Final result)  Result time 07/09/24 17:29:29      Final result by Rea Jimenez MD (07/09/24 17:29:29)                   Impression:      As above      Electronically signed by: Rea Jimenez MD  Date:    07/09/2024  Time:    17:29               Narrative:    EXAMINATION:  XR ANKLE COMPLETE 3 VIEW RIGHT    CLINICAL HISTORY:  Pain in right ankle and joints of right foot    TECHNIQUE:  AP, lateral, and oblique images of the right ankle were performed.    COMPARISON:  None    FINDINGS:  Soft tissue swelling laterally.  Plantar and dorsal calcaneal spurs.  No acute fracture or dislocation                                       Medications   acetaminophen tablet 650 mg (650 mg Oral Given 7/9/24 2017)     Medical Decision Making  Amount and/or Complexity of Data Reviewed  Radiology: ordered.    Risk  OTC drugs.        XR R foot, R tib fib, R ankle within acceptable limits     Pt with ROM intact therefore lower suspicion tendon/ligament/mm rupture but all rom with pain and signs of trauma to R ankle with edema already developing worst at the R lateral  malleolus. Pt placed in short leg splint and given crutches and discussed pain control and fu with ortho, pcp within 1 week at which time she will be instructed on how long to stay in splint and may receive repeat imaging. Pt agreeable to plan. Primary, secondary, tertiary otherwise within acceptable limits although pt not fully exposed as she was axox4, gcs 15, and low risk mechanism and able to clearly tell me where she was and wasn't having sx/pain. Physical exam otherwise reassuring. Considered intracranial bleed as pt says she may have hit head but obs for more than 4 hours after incident and no signs/sx concerning for intracrnial bleed, pt 28 with no risk factors for intracranial bleed from fall from standing otherwise therefore no head imaging at this time.       Strict return precautions discussed    Karla Omalley MD  Emergency Medicine Staff Physician                                      Clinical Impression:  Final diagnoses:  [M25.571] Right ankle pain  [M79.604] Right leg pain  [M79.671] Right foot pain          ED Disposition Condition    Discharge Stable          ED Prescriptions    None       Follow-up Information       Follow up With Specialties Details Why Contact Info Additional Information    [facility] Emergency Medicine Go to  Return to an emergency department immediately if you develop persisting or worsening symptoms or with any new symptoms such as fevers, chills, inability to eat or drink, uncontrollable pain, headaches, chagnes in vision, nausea, vomiting, stomach pain [address] [place] [address] [address] [place] [address] 1st floor             Karla Omalley MD  07/10/24 7431

## 2024-07-09 NOTE — DISCHARGE INSTRUCTIONS
No fracture was seen on your x-rays today but if your pain persists in 1 week then you need repeat imaging to assess for small fractures.    You were able to move your ankle although with significant pain today therefore there is lower suspicion for a tendon or ligament rupture but you need to be reassessed within 1 week by an orthopedic doctor and your primary care doctor.    Please stay in your splint until you follow up with the primary care doctor and orthopedic doctor    For pain you can use 1000 mg of Tylenol 400 mg of ibuprofen every 6 hours.  Please do not use anymore Tylenol and any other sources as this may cause overdose which can kill you.

## 2024-07-09 NOTE — Clinical Note
"Milana Navarro ([place]) was seen and treated in our emergency department on 7/9/2024.  She may return with limitations on 07/10/2024.       Sincerely,       PAN"

## 2024-07-10 ENCOUNTER — APPOINTMENT (OUTPATIENT)
Dept: LAB | Facility: HOSPITAL | Age: 29
End: 2024-07-10
Attending: STUDENT IN AN ORGANIZED HEALTH CARE EDUCATION/TRAINING PROGRAM
Payer: COMMERCIAL

## 2024-07-10 PROCEDURE — 80307 DRUG TEST PRSMV CHEM ANLYZR: CPT

## 2024-07-18 ENCOUNTER — OFFICE VISIT (OUTPATIENT)
Dept: URGENT CARE | Facility: CLINIC | Age: 29
End: 2024-07-18
Payer: OTHER MISCELLANEOUS

## 2024-07-18 VITALS
TEMPERATURE: 99 F | BODY MASS INDEX: 42.17 KG/M2 | OXYGEN SATURATION: 98 % | RESPIRATION RATE: 16 BRPM | HEART RATE: 82 BPM | DIASTOLIC BLOOD PRESSURE: 88 MMHG | SYSTOLIC BLOOD PRESSURE: 135 MMHG | HEIGHT: 63 IN | WEIGHT: 238 LBS

## 2024-07-18 DIAGNOSIS — S99.921A INJURY OF RIGHT ANKLE AND FOOT, INITIAL ENCOUNTER: Primary | ICD-10-CM

## 2024-07-18 DIAGNOSIS — S99.911A INJURY OF RIGHT ANKLE AND FOOT, INITIAL ENCOUNTER: Primary | ICD-10-CM

## 2024-07-18 DIAGNOSIS — Z02.6 ENCOUNTER RELATED TO WORKER'S COMPENSATION CLAIM: ICD-10-CM

## 2024-07-18 PROCEDURE — 73630 X-RAY EXAM OF FOOT: CPT | Mod: RT,S$GLB,, | Performed by: RADIOLOGY

## 2024-07-18 PROCEDURE — 73610 X-RAY EXAM OF ANKLE: CPT | Mod: RT,S$GLB,, | Performed by: RADIOLOGY

## 2024-07-18 RX ORDER — DICLOFENAC SODIUM 10 MG/G
2 GEL TOPICAL 4 TIMES DAILY
Qty: 100 G | Refills: 1 | Status: SHIPPED | OUTPATIENT
Start: 2024-07-18

## 2024-07-18 RX ORDER — MELOXICAM 7.5 MG/1
7.5 TABLET ORAL DAILY
Qty: 30 TABLET | Refills: 1 | Status: SHIPPED | OUTPATIENT
Start: 2024-07-18

## 2024-07-18 NOTE — LETTER
[facility] [place] [address], [address]  [facility] [address]  Phone: [phone]  Fax: [phone]  [facility]: [id number]    Pt Name: Milana Navarro  Injury Date: 07/09/2024   Employee ID: [id number] Date of First Treatment: 07/18/2024   Company: [employer]      Appointment Time: 11:15 AM Arrived: 1030   Provider: Lane Beckford MD Time Out:1200     Office Treatment:   1. Injury of right ankle and foot, initial encounter    2. Encounter related to worker's compensation claim       Med: Mobic, topical diclofenac   Patient Instructions: Daily home exercises/warm soaks, Attention not to aggravate affected area, Elevated affected area, Use splint as directed, Use Crutches as directed    Restrictions: No Prolonged standing/walking, Avoid climbing/kneeling/squatting, Avoid frequent bending/lifting/twisting, No driving company vehicles     Return Appointment: 7/25 or sooner if needed     You have a work related injury. Medical care and treatment required as a result of a work-related injury  should be focused on restoring functional ability required to meet your daily and work activities and return to work, while striving to restore your health to pre-injury status in so far as is feasible.  If Rx a medication that can be sedating, DO NOT TAKE DURING YOUR WORK DAY.    Some OTC measures to help in recovery(if no allergies to, renal issues or pregnant):  Tylenol 325mg 3x per day  Ibuprofen 400mg 3x per day OR Aleve regular strength one tablet 2x per day  Take Pepcid 20mg BID  If applicable or discussed: Magnesium OTC daily; Topical Voltaren Gel; Lidocaine patches, neoprene OTC compression sleeve  Massage area if possible  Resting of the injured area  Ice for ankle, wrist or elbow injury  Elevation of the injured area if applicable  Heating pad for muscle injury  Stretching/ROM exercises as described in clinic.   ** BE ADVISED: You should be in regular contact  with your W/C  to know the status of your claim and /or (if any)pending referrals**

## 2024-07-18 NOTE — PROGRESS NOTES
Subjective:      Patient ID: Milana Navarro is a 28 y.o. female.    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  She was seen at the ER initially.  She had x rays done at the ER.    What have you taken OTC for your symptoms: n/a  What is your current pain scale out of 10? 6  Pt also states that her right foot is constantly tingling, like its sleeping.             7/9/2024- ER NOTE- 27 yo woman presenting with R ankle pain. 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 This was within 2 hours of arrival to the ER. R ankle pain and swelling. No relevant hx. Not on blood thinners. Pt says she does not like how pain meds makes her feel. Took ibuprofen and okay with tylenol but does not think she wants anything stronger. Hesitant to move ankle or bear weight because she has severe pain with movement of the [place]. Fell backward and thinks she may have hit the back of her head but says she is unsure. No loc. No confusion after. Witnessed by coworker. No headache, nausea, vomiting, vision changes, or any other neuro sx/    Foot Injury   The incident occurred more than 1 week ago. The incident occurred at work. Injury mechanism: crushing. The pain is present in the right foot. The pain is at a severity of 6/10. The pain is moderate. The pain has been Constant since onset. Nothing aggravates the symptoms. She has tried nothing for the symptoms. The treatment provided no relief.     ROS  Objective:     Physical Exam  Musculoskeletal:         General: Swelling and tenderness present.        Feet:       Comments: Purple- bruising  Red pain with palp and ROM            Bruising medial and lateral  TTP and minimal ROM after removing splint.  Did need to readjust the  crutches for her- but she was able to tolerate minimal weight bearing well with the boot.   Assessment:      1. Injury of right ankle and foot, initial encounter    2. Encounter related to worker's compensation claim      Plan:     Axial loaded Hyperplantarflex-eversion injury. Today is the first day that she has had the splint removed so understandable to have inc pain with movt.   Will place in boot for more mobility. Using crutches to ambulate.   Light duty for work       Medications Ordered This Encounter   Medications    diclofenac sodium (VOLTAREN) 1 % Gel     Sig: Apply 2 g topically 4 (four) times daily.     Dispense:  100 g     Refill:  1    meloxicam (MOBIC) 7.5 MG tablet     Sig: Take 1 tablet (7.5 mg total) by mouth once daily.     Dispense:  30 tablet     Refill:  1     Patient Instructions: Daily home exercises/warm soaks, Attention not to aggravate affected area, Elevated affected area, Use splint as directed, Use Crutches as directed   Restrictions: No Prolonged standing/walking, Avoid climbing/kneeling/squatting, Avoid frequent bending/lifting/twisting, No driving company vehicles  No follow-ups on file.      XR FOOT COMPLETE 3 VIEW RIGHT    Result Date: 7/18/2024  EXAMINATION: XR FOOT COMPLETE 3 VIEW RIGHT CLINICAL HISTORY: . Encounter for examination for insurance purposes TECHNIQUE: AP, lateral, and oblique views of the right foot were performed. COMPARISON: Right foot x-rays of July 9, 2024 FINDINGS: A fracture of the bones of the right foot is not seen.  A small heel spur is noted at the plantar surface of the calcaneus.  Soft tissue swelling or foreign bodies are not seen.     Small heel spur otherwise negative right foot x-rays Electronically signed by: Nigel Watson MD Date:    07/18/2024 Time:    11:41    XR ANKLE COMPLETE 3 VIEW RIGHT    Result Date: 7/18/2024  EXAMINATION: XR ANKLE COMPLETE 3 VIEW RIGHT CLINICAL HISTORY: Encounter for examination for insurance purposes TECHNIQUE: AP,  lateral, and oblique images of the right ankle were performed. COMPARISON: Right ankle x-rays of July 9, 2024 FINDINGS: A fracture of the right tibia, fibula or talus is not seen.  The ankle mortise is intact.  Soft tissue swelling is not noted.     Negative radiographs of the right ankle. Electronically signed by: Nigel Watson MD Date:    07/18/2024 Time:    11:40    X-Ray Foot Complete Right    Result Date: 7/9/2024  EXAMINATION: XR FOOT COMPLETE 3 VIEW RIGHT CLINICAL HISTORY: . Pain in right foot TECHNIQUE: AP, lateral, and oblique views of the right foot were performed. COMPARISON: None FINDINGS: No acute fracture or dislocation.  Plantar and dorsal calcaneal spurs.     As above Electronically signed by: Rea Jimenez MD Date:    07/09/2024 Time:    17:31    X-Ray Tibia Fibula 2 View Right    Result Date: 7/9/2024  EXAMINATION: XR TIBIA FIBULA 2 VIEW RIGHT CLINICAL HISTORY: Pain in right leg TECHNIQUE: AP and lateral views of the right tibia and fibula were performed. COMPARISON: 05/29/2018 FINDINGS: No acute fracture or dislocation     As above Electronically signed by: Rea Jimenez MD Date:    07/09/2024 Time:    17:29    X-Ray Ankle Complete Right    Result Date: 7/9/2024  EXAMINATION: XR ANKLE COMPLETE 3 VIEW RIGHT CLINICAL HISTORY: Pain in right ankle and joints of right foot TECHNIQUE: AP, lateral, and oblique images of the right ankle were performed. COMPARISON: None FINDINGS: Soft tissue swelling laterally.  Plantar and dorsal calcaneal spurs.  No acute fracture or dislocation     As above Electronically signed by: Rea Jimenez MD Date:    07/09/2024 Time:    17:29

## 2024-07-26 ENCOUNTER — OFFICE VISIT (OUTPATIENT)
Dept: URGENT CARE | Facility: CLINIC | Age: 29
End: 2024-07-26
Payer: OTHER MISCELLANEOUS

## 2024-07-26 VITALS
BODY MASS INDEX: 42.17 KG/M2 | WEIGHT: 238 LBS | HEART RATE: 78 BPM | DIASTOLIC BLOOD PRESSURE: 83 MMHG | TEMPERATURE: 98 F | RESPIRATION RATE: 18 BRPM | HEIGHT: 63 IN | OXYGEN SATURATION: 98 % | SYSTOLIC BLOOD PRESSURE: 122 MMHG

## 2024-07-26 DIAGNOSIS — S99.921D INJURY OF RIGHT ANKLE AND FOOT, SUBSEQUENT ENCOUNTER: Primary | ICD-10-CM

## 2024-07-26 DIAGNOSIS — S90.31XD CONTUSION OF RIGHT FOOT, SUBSEQUENT ENCOUNTER: ICD-10-CM

## 2024-07-26 DIAGNOSIS — S99.911D INJURY OF RIGHT ANKLE AND FOOT, SUBSEQUENT ENCOUNTER: Primary | ICD-10-CM

## 2024-07-26 NOTE — LETTER
[facility] - [place] [address], [address]  [facility] [address]  Phone: [phone]  Fax: [phone]  [facility]: [id number]    Pt Name: Milana Navarro  Injury Date: 07/09/2024   Employee ID: [id number] Date of Treatment: 07/26/2024   Company: [employer]      Appointment Time: 03:00 PM Arrived: 317 pm   Provider: Jenny Leonard NP Time Out:415 pm     Office Treatment:   1. Injury of right ankle and foot, subsequent encounter    2. Contusion of right foot, subsequent encounter          Patient Instructions: Attention not to aggravate affected area, Daily home exercises/warm soaks    Restrictions: Regular Duty, Discharged from Occupational Health     Return Appointment: if symptoms worsen or fail to improve

## 2024-07-26 NOTE — PROGRESS NOTES
Subjective:      Patient ID: Milana Navarro is a 28 y.o. female.    Chief Complaint: Follow-up    Patient's place of employment - [employer]   Patient's job title - deputy  Date of Injury - 7/9/2024  Body part injured - right foot  Current work status per last visit - Restrictions: No Prolonged standing/walking, Avoid climbing/kneeling/squatting, Avoid frequent bending/lifting/twisting, No driving company vehicles  Improved, same, or worse - improved, ready to be out the boot  Pain Scale right now (1-10) -  0    Patient is feeling much better today no longer having any pain.  She has been wearing the walker boot but wants to get out of it.  She no longer is having any swelling or ecchymosis.  Last took a BC powder 6 days ago.  Wants to return to work.MWT  Pt states that she is ready to be out the boot, she hasn't taken any of the medication that was prescribed.       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  She was seen at the ER initially.  She had x rays done at the ER.    What have you taken OTC for your symptoms: n/a  What is your current pain scale out of 10? 6  Pt also states that her right foot is constantly tingling, like its sleeping.       Foot Injury   The incident occurred more than 1 week ago. The incident occurred at work. The injury mechanism was a direct blow. The pain is present in the right ankle. The pain is at a severity of 0/10. The patient is experiencing no pain. The pain has been Improving since onset. Pertinent negatives include no numbness. She reports no foreign bodies present. Nothing aggravates the symptoms. She has tried ice and rest (BC powder) for the symptoms. The treatment provided significant relief.       Musculoskeletal:  Negative for pain, joint pain, joint swelling, abnormal ROM of joint and muscle  cramps.   Skin:  Negative for erythema and bruising.   Neurological:  Negative for numbness and tingling.     Objective:     Physical Exam  Constitutional:       General: She is not in acute distress.     Appearance: Normal appearance.   HENT:      Right Ear: External ear normal.      Left Ear: External ear normal.   Eyes:      Conjunctiva/sclera: Conjunctivae normal.   Cardiovascular:      Pulses: Normal pulses.   Pulmonary:      Effort: Pulmonary effort is normal.   Abdominal:      General: Abdomen is flat.   Musculoskeletal:         General: No swelling, tenderness or deformity. Normal range of motion.      Right ankle: No swelling or deformity. No tenderness. Normal range of motion. Anterior drawer test negative. Normal pulse.      Right foot: Normal range of motion and normal capillary refill. No swelling or deformity. Normal pulse.      Comments: Patient has full range of motion to right foot and ankle without pain.  She is ambulating well without pain or difficulty.  Able to heel and toe walk without pain or difficulty.  Neurovascular intact distally.   Skin:     Capillary Refill: Capillary refill takes less than 2 seconds.      Findings: No bruising or erythema.   Neurological:      General: No focal deficit present.      Mental Status: She is alert and oriented to person, place, and time.   Psychiatric:         Mood and Affect: Mood normal.         Behavior: Behavior normal.         Thought Content: Thought content normal.         Judgment: Judgment normal.        Assessment:      1. Injury of right ankle and foot, subsequent encounter    2. Contusion of right foot, subsequent encounter      Plan:     Patient is feeling much better today and no longer having any pain or problems with her right foot or ankle.  For physical exam within normal limits today.  She is ambulating well without difficulty.  She may discontinue the boot. Instructed to wear supportive type shoes or work boots.     Patient  Instructions: Attention not to aggravate affected area, Daily home exercises/warm soaks   Restrictions: Regular Duty, Discharged from Occupational Health  Follow up if symptoms worsen or fail to improve.

## 2024-08-07 DIAGNOSIS — E11.9 TYPE 2 DIABETES MELLITUS WITHOUT COMPLICATION: ICD-10-CM

## 2024-08-07 DIAGNOSIS — E11.9 TYPE 2 DIABETES MELLITUS WITHOUT COMPLICATION, WITHOUT LONG-TERM CURRENT USE OF INSULIN: ICD-10-CM

## 2024-08-08 ENCOUNTER — PATIENT OUTREACH (OUTPATIENT)
Dept: ADMINISTRATIVE | Facility: HOSPITAL | Age: 29
End: 2024-08-08
Payer: COMMERCIAL

## 2024-08-20 ENCOUNTER — PATIENT OUTREACH (OUTPATIENT)
Dept: ADMINISTRATIVE | Facility: HOSPITAL | Age: 29
End: 2024-08-20
Payer: COMMERCIAL

## 2024-08-20 NOTE — PROGRESS NOTES
"Population Health Chart Review & Patient Outreach Details      Additional [facility] Health Notes:      Attempted to outreach patient regarding overdue/ due HM via "[facility]". No response after a week. Now sending outreach via mail out letter.         Updates Requested / Reviewed:      Updated Care Coordination Note and Care Everywhere         Health Maintenance Topics Overdue:      [facility] Score: 4     Urine Screening  Eye Exam  Hemoglobin A1c  Foot Exam                       Health Maintenance Topic(s) Outreach Outcomes & Actions Taken:    Eye Exam - Outreach Outcomes & Actions Taken  :      Lab(s) - Outreach Outcomes & Actions Taken  :    "

## 2024-08-20 NOTE — LETTER
August 20, 2024    Milana Ramon  [address]  [place] [address]             [facility]  [address]  [place] [address]  Phone: [phone] Milana Navarro  [address]  [place] [address]     Dear Elisia Barthe, [facility] is committed to your overall health. To help you get the most out of each of your visits, we will review your information to make sure you are up to date on all of your recommended tests and/or procedures.       Dr. Aleman, Mary Wolff MD  has found that your chart shows you may be due for     Eye Exam  Hgb A1C and Urine Microalbumin (labs)     If you have had any of the above done at another facility, please let us know and we will request a copy of your report so that your [facility] record will be complete. If you would like to schedule this/ any of these, please contact the clinic at [phone].     Thank You,   Your [facility],  Phone [phone]  Fax [phone]

## 2024-09-03 ENCOUNTER — CLINICAL SUPPORT (OUTPATIENT)
Dept: FAMILY MEDICINE | Facility: CLINIC | Age: 29
End: 2024-09-03

## 2024-09-03 DIAGNOSIS — Z00.00 ROUTINE ADULT HEALTH MAINTENANCE: Primary | ICD-10-CM

## 2024-09-03 PROCEDURE — 80305 DRUG TEST PRSMV DIR OPT OBS: CPT | Mod: S$GLB,,, | Performed by: FAMILY MEDICINE

## 2024-09-03 PROCEDURE — 99202 OFFICE O/P NEW SF 15 MIN: CPT | Mod: S$GLB,,, | Performed by: FAMILY MEDICINE

## 2024-09-06 NOTE — PROGRESS NOTES
Milana has presented today for Pre-Placement screening on behalf of [facility]'s Office. Milana Navarro has completed Non-DOT Physical and Non-DOT Drug Screen .    Ya Lomeli

## 2024-09-20 ENCOUNTER — PATIENT MESSAGE (OUTPATIENT)
Dept: ADMINISTRATIVE | Facility: HOSPITAL | Age: 29
End: 2024-09-20
Payer: COMMERCIAL

## 2024-09-20 ENCOUNTER — PATIENT OUTREACH (OUTPATIENT)
Dept: ADMINISTRATIVE | Facility: HOSPITAL | Age: 29
End: 2024-09-20
Payer: COMMERCIAL

## 2024-09-20 NOTE — PROGRESS NOTES
Population Health Chart Review & Patient Outreach Details      Additional [facility] Health Notes:               Updates Requested / Reviewed:      Updated Care Coordination Note         Health Maintenance Topics Overdue:      [facility] Score: 4     Urine Screening  Eye Exam  Hemoglobin A1c  Foot Exam                       Health Maintenance Topic(s) Outreach Outcomes & Actions Taken:    Eye Exam - Outreach Outcomes & Actions Taken  : msg

## 2024-10-29 ENCOUNTER — PATIENT MESSAGE (OUTPATIENT)
Dept: ADMINISTRATIVE | Facility: HOSPITAL | Age: 29
End: 2024-10-29
Payer: COMMERCIAL

## 2024-11-05 ENCOUNTER — OFFICE VISIT (OUTPATIENT)
Dept: URGENT CARE | Facility: CLINIC | Age: 29
End: 2024-11-05
Payer: COMMERCIAL

## 2024-11-05 VITALS
RESPIRATION RATE: 16 BRPM | HEART RATE: 83 BPM | HEIGHT: 63 IN | BODY MASS INDEX: 42.17 KG/M2 | SYSTOLIC BLOOD PRESSURE: 128 MMHG | TEMPERATURE: 99 F | OXYGEN SATURATION: 98 % | DIASTOLIC BLOOD PRESSURE: 92 MMHG | WEIGHT: 238 LBS

## 2024-11-05 DIAGNOSIS — K52.9 AGE (ACUTE GASTROENTERITIS): Primary | ICD-10-CM

## 2024-11-05 PROCEDURE — 99214 OFFICE O/P EST MOD 30 MIN: CPT | Mod: S$GLB,,, | Performed by: STUDENT IN AN ORGANIZED HEALTH CARE EDUCATION/TRAINING PROGRAM

## 2024-11-05 RX ORDER — ONDANSETRON 4 MG/1
4 TABLET, ORALLY DISINTEGRATING ORAL
Status: COMPLETED | OUTPATIENT
Start: 2024-11-05 | End: 2024-11-05

## 2024-11-05 RX ORDER — ONDANSETRON 4 MG/1
4 TABLET, ORALLY DISINTEGRATING ORAL EVERY 6 HOURS PRN
Qty: 20 TABLET | Refills: 0 | Status: SHIPPED | OUTPATIENT
Start: 2024-11-05

## 2024-11-05 RX ORDER — ONDANSETRON 2 MG/ML
4 INJECTION INTRAMUSCULAR; INTRAVENOUS
Status: DISCONTINUED | OUTPATIENT
Start: 2024-11-05 | End: 2024-11-05

## 2024-11-05 RX ORDER — LOPERAMIDE HYDROCHLORIDE 2 MG/1
2 CAPSULE ORAL 4 TIMES DAILY PRN
Qty: 20 CAPSULE | Refills: 0 | Status: SHIPPED | OUTPATIENT
Start: 2024-11-05

## 2024-11-05 RX ADMIN — ONDANSETRON 4 MG: 4 TABLET, ORALLY DISINTEGRATING ORAL at 11:11

## 2024-11-05 NOTE — PROGRESS NOTES
"Subjective:      Patient ID: Milana Navarro is a 29 y.o. female.    Vitals:  height is 5' 3" (1.6 m) and weight is 108 kg (238 lb). Her oral temperature is 98.7 °F (37.1 °C). Her blood pressure is 128/92 (abnormal) and her pulse is 83. Her respiration is 16 and oxygen saturation is 98%.     Chief Complaint: Emesis    Patient is a 29-year-old female with a past medical history of PCOS and type 2 diabetes who presents to clinic for evaluation of stomach bug related symptoms.  Patient states symptoms began yesterday.  Patient reports she is vaccinated.  Patient reports no recent or known sick exposures.  Patient reports symptoms began after eating a hot and spicy chicken sandwich for [employer].  Patient states has had some abdominal cramping.  Patient states mostly having nausea, vomiting, diarrhea.  Patient states mostly had vomiting yesterday.  Patient states now primarily with episodes of diarrhea.  Patient states that she has not experienced any rectal bleeding, urinary symptoms, fever or chills, chest pain or shortness for breath, body aches, headaches or dizziness, or change in mentation.  Patient reports symptoms better today than they were yesterday.    Emesis   This is a new problem. The current episode started yesterday. The problem has been gradually improving. Associated symptoms include abdominal pain (Abdominal cramping) and diarrhea. Pertinent negatives include no chest pain, chills, coughing, dizziness, fever or headaches.       Constitution: Negative. Negative for chills, sweating, fatigue and fever.   HENT: Negative.     Neck: neck negative.   Cardiovascular: Negative.  Negative for chest pain and palpitations.   Eyes: Negative.    Respiratory: Negative.  Negative for chest tightness, cough and shortness of breath.    Gastrointestinal:  Positive for abdominal pain (Abdominal cramping), nausea, vomiting and diarrhea.   Endocrine: negative.   Genitourinary: Negative.  Negative for dysuria, frequency and " urgency.   Musculoskeletal: Negative.    Skin: Negative.  Negative for color change, pale, rash and erythema.   Allergic/Immunologic: Negative.    Neurological: Negative.  Negative for dizziness, light-headedness, passing out, headaches, disorientation and altered mental status.   Hematologic/Lymphatic: Negative.    Psychiatric/Behavioral: Negative.  Negative for altered mental status, disorientation and confusion.       Objective:     Physical Exam   Constitutional: She is oriented to person, place, and time. She appears well-developed. She is cooperative.  Non-toxic appearance. She does not appear ill. No distress.   HENT:   Head: Normocephalic and atraumatic.   Ears:   Right Ear: Hearing and external ear normal.   Left Ear: Hearing and external ear normal.   Nose: Nose normal. No mucosal edema or nasal deformity. No epistaxis. Right sinus exhibits no maxillary sinus tenderness and no frontal sinus tenderness. Left sinus exhibits no maxillary sinus tenderness and no frontal sinus tenderness.   Mouth/Throat: Uvula is midline, oropharynx is clear and moist and mucous membranes are normal. No trismus in the jaw. Normal dentition. No uvula swelling.   Eyes: Conjunctivae and lids are normal. Pupils are equal, round, and reactive to light.   Neck: Trachea normal and phonation normal. Neck supple.   Cardiovascular: Normal rate, regular rhythm, normal heart sounds and normal pulses.   Pulmonary/Chest: Effort normal and breath sounds normal. No respiratory distress. She has no wheezes. She has no rhonchi. She has no rales.   Abdominal: Normal appearance and bowel sounds are normal. She exhibits no distension. Soft. There is no abdominal tenderness. There is no rebound, no guarding, no left CVA tenderness and no right CVA tenderness.   Musculoskeletal: Normal range of motion.         General: Normal range of motion.   Neurological: She is alert and oriented to person, place, and time. She exhibits normal muscle tone.    Skin: Skin is warm, dry, intact, not diaphoretic, not pale and no rash. Capillary refill takes less than 2 seconds. No erythema   Psychiatric: Her speech is normal and behavior is normal. Judgment and thought content normal.   Nursing note and vitals reviewed.      Assessment:     1. AGE (acute gastroenteritis)        Plan:       AGE (acute gastroenteritis)    Other orders  -     ondansetron (ZOFRAN-ODT) 4 MG TbDL; Take 1 tablet (4 mg total) by mouth every 6 (six) hours as needed (Nausea).  Dispense: 20 tablet; Refill: 0  -     loperamide (IMODIUM) 2 mg capsule; Take 1 capsule (2 mg total) by mouth 4 (four) times daily as needed for Diarrhea.  Dispense: 20 capsule; Refill: 0  -     Discontinue: ondansetron injection 4 mg  -     ondansetron disintegrating tablet 4 mg                Zofran 4 mg sublingual in clinic.  Patient tolerated well.  No complications noted.    Take medications as prescribed.    Tylenol/Motrin per package instructions for any pain or fever.    [place] diet for 24-48 hours then progress as tolerated.  Assure adequate hydration.    Follow-up with PCP in 1-2 days.    Return to clinic as needed.    To ED for any new or acutely worsening symptoms.    Work excuse provided.    Patient in agreement with plan of care.      DISCLAIMER: Please note that my documentation in this Electronic Healthcare Record was produced using speech recognition software and therefore may contain errors related to that software system.These could include grammar, punctuation and spelling errors or the inclusion/exclusion of phrases that were not intended. Garbled syntax, mangled pronouns, and other bizarre constructions may be attributed to that software system.

## 2024-11-05 NOTE — LETTER
November 5, 2024      [facility]  [address] [address]       Patient: Milana Navarro   YOB: 1995  Date of Visit: 11/05/2024    To Whom It May Concern:    Blessing Navarro  was at [facility] on 11/05/2024. The patient may return to work/school on 11/06/2024 with no restrictions. If you have any questions or concerns, or if I can be of further assistance, please do not hesitate to contact me.    Sincerely,    Luis Dailey, NP